# Patient Record
Sex: MALE | Race: WHITE | Employment: FULL TIME
[De-identification: names, ages, dates, MRNs, and addresses within clinical notes are randomized per-mention and may not be internally consistent; named-entity substitution may affect disease eponyms.]

---

## 2021-07-09 ENCOUNTER — TRANSCRIBE ORDERS (OUTPATIENT)
Dept: OTHER | Age: 56
End: 2021-07-09

## 2021-07-09 DIAGNOSIS — I42.8 NONISCHEMIC CARDIOMYOPATHY (H): Primary | ICD-10-CM

## 2021-07-12 ENCOUNTER — TELEPHONE (OUTPATIENT)
Dept: CARDIOLOGY | Facility: CLINIC | Age: 56
End: 2021-07-12

## 2021-07-12 NOTE — TELEPHONE ENCOUNTER
Spoke with pt. Pt asked for specifics on clinic name and physician information and is checking with insurance prior to scheduling his referral. Pt is referred for a general cardiology eval with Dr. Juarez

## 2021-07-13 ENCOUNTER — CARE COORDINATION (OUTPATIENT)
Dept: CARDIOLOGY | Facility: CLINIC | Age: 56
End: 2021-07-13

## 2021-07-13 NOTE — PROGRESS NOTES
Referral- Heart Failure    REFERRING CLINIC INFORMATION:    Referring Info: MD [facility].   Provider Contact Info: [phone]  Nursing Care Team Contact Info:  Tonja     REFERRAL Info/Hx:     Patient Preferred Phone Number:  [phone]   Consent to Communicate: None found   Insurance Obtained: No  Imaging:   Records:       July 13, 2021   Patient being referred for 2nd opinion and ADV therapies to Jj Juarez first, if not available soon, then Pedro Reyes. Left message with nurse to have imaging pushed.     Sending to HF nurse to review.     MRI and Echo are being pushed. Confirmation that images were received.       ATTEMPTS TO CONTACT:  July 09, 2021 - Spoke to patient regarding referral process and contact information exchanged. Will call back to schedule soon.   July 15, 2021 - appt made with Dr. Acuña for 8/25/21      Melquiades Antonio CMA  Heart Failure, Advanced Heart Failure & CORE  Referral Specialist &     [facility]  Cardiology  Office: [phone]  [address] [place] [address]

## 2021-07-15 PROBLEM — F10.21 ALCOHOL DEPENDENCE IN REMISSION (H): Status: ACTIVE | Noted: 2020-12-04

## 2021-07-15 PROBLEM — I42.9 CARDIOMYOPATHY (H): Status: ACTIVE | Noted: 2020-11-30

## 2021-07-15 PROBLEM — G47.33 MODERATE OBSTRUCTIVE SLEEP APNEA: Status: ACTIVE | Noted: 2020-11-30

## 2021-07-15 RX ORDER — ATORVASTATIN CALCIUM 40 MG/1
40 TABLET, FILM COATED ORAL DAILY
COMMUNITY
Start: 2020-11-11 | End: 2024-08-06

## 2021-07-15 RX ORDER — METOPROLOL SUCCINATE 100 MG/1
100 TABLET, EXTENDED RELEASE ORAL DAILY
COMMUNITY
Start: 2021-03-29 | End: 2024-08-06

## 2021-07-15 RX ORDER — SPIRONOLACTONE 25 MG/1
50 TABLET ORAL DAILY
COMMUNITY
Start: 2021-04-16 | End: 2024-08-06

## 2021-07-15 RX ORDER — ASPIRIN 81 MG/1
81 TABLET, CHEWABLE ORAL DAILY
COMMUNITY
Start: 2020-11-11

## 2021-07-15 NOTE — PROGRESS NOTES
Patient is referred by Dr Tulio Barney to Dr. Juarez for nonischemic cardiomyopathy and systolic heart failure. See Dr. Barney's note below.     His nonischemic cardiomyopathy was initially diagnosed November of 2020. His ejection fraction on echo was 10% with LVIDd of 7.7 cm. Coronary angiogram November 23, 2020 showed mild-to-moderate nonobstructive coronary artery disease; right heart catheterization 11/23/2020 demonstrated mean PA pressure was 29, wedge pressure 19 mm Hg, and borderline normal cardiac output of 4.1 liters/minute. A cardiac MRI December 7, 2020 was consistent with nonischemic cardiomyopathy, but no late gadolinium enhancement was seen. He has a known wide left bundle branch block. We have titrated him on goal-directed medical therapy which now includes full-dose Entresto, Toprol  mg b.i.d., and Aldactone 25 mg daily. A repeat transthoracic echocardiogram dated April 12, 2021 showed mild improvement in LV function and size, but severely declined LV function at 17% ejection fraction. Because of his known left bundle-branch block and wide QRS, I referred him to electrophysiology, and he had this appointment on June 11, 2021. It sounds like at that point, CRT defibrillator therapy was recommended, but Joel wanted to think about this more. He is now here for follow-up in general cardiology clinic.     Continues to have mild symptoms with shortness of breath on exertion. Now walks his dog daily. Has NYHA class 2 symptoms. He has no lower extremity edema, PND, orthopnea, palpitations, dizziness, syncope. Remains abstinent from alcohol. Recall that he was drinking approximately 6 oz of hard liquor daily for many years until he achieved abstinence back when his cardiomyopathy was diagnosed at the end of 2020. He has been working full-time over the last month, and is fairly active at his job as an . He does not note any significant limitation.      He is essentially on maximal  goal-directed medical therapy. Although size and function of his LV (and RV) have modestly improved, his LV remains severely dysfunctional. Fortunately, he is only NYHA class 2. At this stage of pathology, it would be highly recommended that he pursue not only an internal cardiac defibrillator to prevent sudden cardiac death but also consideration for cardiac resynchronization therapy with a goal of improving LV function in the long term. Joel is no overall a little skeptical about the procedure. He would like to seek a 2nd opinion, which I think is very reasonable. In light of his severe cardiomyopathy, young age, continued alcohol abstinence, and particularly that he may be a candidate in need for advanced therapies in the distant future, I think a 2nd opinion from a transplant center would be preferable. We had a long discussion today about the risks and benefits of CRT, the indications, and rationale. He will think things over the next couple of days. Overall it sounds like he may be agreeable. If he would like a 2nd opinion, I will refer to the [facility] advanced Heart failure Clinic.

## 2021-08-17 ENCOUNTER — CARE COORDINATION (OUTPATIENT)
Dept: CARDIOLOGY | Facility: CLINIC | Age: 56
End: 2021-08-17

## 2021-08-17 DIAGNOSIS — I42.9 CARDIOMYOPATHY (H): Primary | ICD-10-CM

## 2021-08-24 ENCOUNTER — ANCILLARY PROCEDURE (OUTPATIENT)
Dept: CARDIOLOGY | Facility: CLINIC | Age: 56
End: 2021-08-24
Attending: INTERNAL MEDICINE
Payer: COMMERCIAL

## 2021-08-24 ENCOUNTER — LAB (OUTPATIENT)
Dept: LAB | Facility: CLINIC | Age: 56
End: 2021-08-24
Attending: INTERNAL MEDICINE
Payer: COMMERCIAL

## 2021-08-24 VITALS
OXYGEN SATURATION: 97 % | HEIGHT: 69 IN | BODY MASS INDEX: 29.18 KG/M2 | HEART RATE: 70 BPM | DIASTOLIC BLOOD PRESSURE: 78 MMHG | SYSTOLIC BLOOD PRESSURE: 123 MMHG | WEIGHT: 197 LBS

## 2021-08-24 DIAGNOSIS — I42.9 CARDIOMYOPATHY (H): ICD-10-CM

## 2021-08-24 DIAGNOSIS — I44.7 LEFT BUNDLE BRANCH BLOCK: Primary | ICD-10-CM

## 2021-08-24 DIAGNOSIS — I42.8 NONISCHEMIC CARDIOMYOPATHY (H): ICD-10-CM

## 2021-08-24 DIAGNOSIS — I50.20 HEART FAILURE WITH REDUCED EJECTION FRACTION, NYHA CLASS III (H): ICD-10-CM

## 2021-08-24 DIAGNOSIS — E78.2 MIXED HYPERLIPIDEMIA: ICD-10-CM

## 2021-08-24 DIAGNOSIS — I10 BENIGN ESSENTIAL HYPERTENSION: ICD-10-CM

## 2021-08-24 LAB
ALBUMIN SERPL-MCNC: 4.1 G/DL (ref 3.4–5)
ALP SERPL-CCNC: 68 U/L (ref 40–150)
ALT SERPL W P-5'-P-CCNC: 36 U/L (ref 0–70)
ANION GAP SERPL CALCULATED.3IONS-SCNC: 5 MMOL/L (ref 3–14)
AST SERPL W P-5'-P-CCNC: 19 U/L (ref 0–45)
ATRIAL RATE - MUSE: 72 BPM
BILIRUB SERPL-MCNC: 0.2 MG/DL (ref 0.2–1.3)
BUN SERPL-MCNC: 12 MG/DL (ref 7–30)
CALCIUM SERPL-MCNC: 9.1 MG/DL (ref 8.5–10.1)
CHLORIDE BLD-SCNC: 107 MMOL/L (ref 94–109)
CO2 SERPL-SCNC: 29 MMOL/L (ref 20–32)
CREAT SERPL-MCNC: 1 MG/DL (ref 0.66–1.25)
DIASTOLIC BLOOD PRESSURE - MUSE: NORMAL MMHG
ERYTHROCYTE [DISTWIDTH] IN BLOOD BY AUTOMATED COUNT: 13.2 % (ref 10–15)
GFR SERPL CREATININE-BSD FRML MDRD: 84 ML/MIN/1.73M2
GLUCOSE BLD-MCNC: 108 MG/DL (ref 70–99)
HCT VFR BLD AUTO: 36.9 % (ref 40–53)
HGB BLD-MCNC: 12.4 G/DL (ref 13.3–17.7)
INTERPRETATION ECG - MUSE: NORMAL
LVEF ECHO: NORMAL
MCH RBC QN AUTO: 31.7 PG (ref 26.5–33)
MCHC RBC AUTO-ENTMCNC: 33.6 G/DL (ref 31.5–36.5)
MCV RBC AUTO: 94 FL (ref 78–100)
NT-PROBNP SERPL-MCNC: 299 PG/ML (ref 0–125)
P AXIS - MUSE: 63 DEGREES
PLATELET # BLD AUTO: 242 10E3/UL (ref 150–450)
POTASSIUM BLD-SCNC: 4.3 MMOL/L (ref 3.4–5.3)
PR INTERVAL - MUSE: 240 MS
PROT SERPL-MCNC: 7.9 G/DL (ref 6.8–8.8)
QRS DURATION - MUSE: 188 MS
QT - MUSE: 464 MS
QTC - MUSE: 508 MS
R AXIS - MUSE: 23 DEGREES
RBC # BLD AUTO: 3.91 10E6/UL (ref 4.4–5.9)
SODIUM SERPL-SCNC: 141 MMOL/L (ref 133–144)
SYSTOLIC BLOOD PRESSURE - MUSE: NORMAL MMHG
T AXIS - MUSE: 206 DEGREES
VENTRICULAR RATE- MUSE: 72 BPM
WBC # BLD AUTO: 6.7 10E3/UL (ref 4–11)

## 2021-08-24 PROCEDURE — 93306 TTE W/DOPPLER COMPLETE: CPT | Performed by: INTERNAL MEDICINE

## 2021-08-24 PROCEDURE — G0463 HOSPITAL OUTPT CLINIC VISIT: HCPCS | Mod: 25

## 2021-08-24 PROCEDURE — 80053 COMPREHEN METABOLIC PANEL: CPT | Performed by: PATHOLOGY

## 2021-08-24 PROCEDURE — 83880 ASSAY OF NATRIURETIC PEPTIDE: CPT | Performed by: PATHOLOGY

## 2021-08-24 PROCEDURE — 99204 OFFICE O/P NEW MOD 45 MIN: CPT | Mod: 25 | Performed by: INTERNAL MEDICINE

## 2021-08-24 PROCEDURE — 36415 COLL VENOUS BLD VENIPUNCTURE: CPT | Performed by: PATHOLOGY

## 2021-08-24 PROCEDURE — 85027 COMPLETE CBC AUTOMATED: CPT | Performed by: PATHOLOGY

## 2021-08-24 PROCEDURE — 93005 ELECTROCARDIOGRAM TRACING: CPT

## 2021-08-24 RX ADMIN — Medication 6 ML: at 11:54

## 2021-08-24 ASSESSMENT — PAIN SCALES - GENERAL: PAINLEVEL: NO PAIN (0)

## 2021-08-24 ASSESSMENT — MIFFLIN-ST. JEOR: SCORE: 1706.03

## 2021-08-24 NOTE — PATIENT INSTRUCTIONS
Dr. Juarez recommends:    Follow up clinic visit with Dr. Juarez in 3 months with labs same day.    Thank you for your visit today.  Please call me with any questions or concerns.   Padilla Knight RN  Cardiology Care Coordinator  [phone], press option 1 then option 4

## 2021-08-24 NOTE — LETTER
8/24/2021      RE: Joel Resendiz  [id number]  [facility] [address]       Dear Colleague,    Thank you for the opportunity to participate in the care of your patient, Joel Resendiz, at the [facility] [place] at [facility]. Please see a copy of my visit note below.    [facility] Advanced Heart Failure Clinic   New Patient Visit    HPI:   Dear Colleagues,     We had the pleasure of seeing Joel Resendiz in the  [facility] Cardiology Advanced Heart Failure Clinic on August 23, 2021.     As you know he is a very pleasant 55 yo M with the following significant PMHx:       NICM with HFrEF LVEF 17% TTE 4/2021 ACC AHA Stage C NYHA Class II    Colonoscopy 5/2021- tubular adenoma in cecum    Sleep apnea compliant with CPAP    History Alcohol abuse     Former smoker    Cardiac Hx:   DARRICK initially dx Nov 2020- he had SOB x2-3 months couldn't walk dog <0.5 mile up a small hill next to his house, orthopnea- COVID neg.  TTE with LVEF 10% LVID 7.7cm.  LHC 11/23/20 mild to mod nonobstructive CAD with RHC mPAP 29, WP 19, CO 4.1.   cMRI 12/7/20 showed LVEF 13%, LVIDD 8.1cm, RV EF 27%, no LGE, 2+MR. EKG with wide LBBB QRS 192ms.    He was sarted on GDMT currently on Toprol 100mg bid, full dose Entresto, Aldactone 25mg daily. Other cardiac meds include lipitor 40 and baby aspirin.  Repeat TTE April showed LVEF 17% LVIDD 6.7cm.    He was referred to EP Dr. Vuaghn who he saw in June and CRT-D was recommended but patient wanted to think more about it.   He has been following with Dr. Barney who referred him to us.     Current symptoms/issues:   He reports NYHA Class 2 sxs- he is able to walk his dog daily 30 minutes without issue can do the small hill now.  Not pushing himself. Stairs no issue. Townhouse.    Dizziness if get up quickly.    Did do cardiac rehab.   No LE edema, orthopnea, PND. Never needed lasix.  No palpitations,  presyncope, or syncope.  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  Appetite/Diet/Fluid- Appetite fine. Limits salt but not always.   Exercise- not formal exercise.   Does not feel like he is slowing down.    PFTs ordered but not done.     Social Hx:     Etoh: hx of alcohol abuse has been in treatment and quit for 6 years.  Before was drinking about 200-250mL of liquor daily for at least 5 years until dx with HF in 2020.     Smoking: quit in , 1pk would last a week/social smoker.    Other drugs/supplements: no    Work: works as an  but now in the [employer] department so very sedentary. 5-7k steps/day.    Family/Social support: , currently in a relationship for 4 years with live in . 1 son in college in [place], daughter in [place].    Family hx:   -mom 87 in nursing home-has CAD w/stents  -3 brothers  -dad passed- had CAD w/stents but  of cancer  -dad father  in his sleep in his 50s  -No hx of HF, premature CAD, SCD, stroke, DM.    PAST MEDICAL HISTORY:  No past medical history on file.  Reviewed above.     FAMILY HISTORY: reviewed    SOCIAL HISTORY: reviewed    CURRENT MEDICATIONS:    Current Outpatient Medications:      aspirin (ASA) 81 MG chewable tablet, Take 81 mg by mouth daily, Disp: , Rfl:      atorvastatin (LIPITOR) 40 MG tablet, Take 40 mg by mouth daily, Disp: , Rfl:      metoprolol succinate ER (TOPROL-XL) 100 MG 24 hr tablet, Take 100 mg by mouth 2 times daily, Disp: , Rfl:      sacubitril-valsartan (ENTRESTO)  MG per tablet, Take 1 tablet by mouth 2 times daily, Disp: , Rfl:      spironolactone (ALDACTONE) 25 MG tablet, Take 50 mg by mouth daily, Disp: , Rfl:     ROS:   Constitutional: No fever, chills, or sweats. Weight . + fatigue   ENT: No visual disturbance, ear ache, epistaxis, sore throat.   Allergies/Immunologic: Negative.   Respiratory: No cough, hemoptysis.   Cardiovascular: As per HPI.   GI: No nausea, vomiting,  "hematemesis, melena, or hematochezia.   : No urinary frequency, dysuria, or hematuria.   Integument: Negative.   Psychiatric: Negative.   Neuro: Negative.   Endocrinology: Negative.   Musculoskeletal: Negative.    EXAM:  /78 (BP Location: Right arm, Patient Position: Chair, Cuff Size: Adult Regular)   Pulse 70   Ht 1.74 m (5' 8.5\")   Wt 89.4 kg (197 lb)   SpO2 97%   BMI 29.52 kg/m     General: appears comfortable, alert and articulate  Head: normocephalic, atraumatic  Eyes: anicteric sclera, EOMI  Neck: no adenopathy  Orophyarynx: moist mucosa, no lesions, dentition intact  Heart: regular rate and rhythm, S1/S2 normal, no rub, estimated JVP 9 cm   Lungs: clear, no rales or wheezing  Abdomen: soft, non-tender, bowel sounds present, no hepatosplenomegaly  Extremities: no clubbing, cyanosis or edema  Neurological: normal speech and affect, no gross motor deficit    Relevant Tests/Imaging:     TTE 4/12/21  1. Moderate left ventricular dilation is present. LVEDd 6.7 cm. Normal   left ventricular wall thickness. Abnormal septal motion consistent   with left bundle branch block. Severe diffuse hypokinesis is present.   Left ventricular ejection fraction is visually estimated at 17%. Left   ventricular Doppler filling pattern consistent with Grade I (mild)   diastolic dysfunction.   2. Normal right ventricle size and normal global function.   3. No significant valve pathology was seen.   4. Moderate dilation of the aorta is present involving the sinuses of   Valsalva. (Maximal dimension 4.7 cm).   5. The inferior vena cava is small suggesting volume depletion.   6. There is no pericardial effusion.   7. Compared to the previous study done on 11/11/20, the left ventricle   has decreased in size but the left ventricular systolic function   remains severely depressed. The right ventricle has normalized in size   and function. There is less mitral and tricuspid regurgitation. The   central venous pressure has " normalized. However, the sinus of Valsalva   of the aorta measures larger (4.2 cm --> 4.7 cm).     7D [place] Jan 2021  Findings   Patient had a min HR of 41 bpm, max sinus HR of 178 bpm, and avg HR of 74 bpm.  Predominant underlying rhythm was Sinus Rhythm. First Degree AV Block was present. Bundle Branch Block/IVCD was present. QRS morphology changes were present throughout recording.     5 Ventricular Tachycardia runs occurred, the run with the fastest interval lasting 5 beats with a max rate of 179 bpm, the longest lasting 4 beats with an avg rate of 101 bpm.     Isolated SVEs were rare (<1.0%), SVE Couplets were rare (<1.0%), and SVE Triplets were rare (<1.0%).     Isolated VEs were rare (<1.0%, 231), VE Couplets were rare (<1.0%, 3), and VE Triplets were rare (<1.0%, 4). Ventricular Bigeminy was present.     Comment: Symptoms occurred in association with PVCs or ventricular ectopy.     Cardiac MRI 12/7/20  IMPRESSION:   1. The left ventricular systolic function was abnormal; calculated ejection fraction 13%.   Severe diffuse hypokinesis with abnormal septal motion consistent with LBBB.   2. Severe LV dilatation (LVIDD 81mm) with increased LV volumes.   3. Reduced RV systolic function. EF is calculated at 27%.   4. Delayed enhancement was not seen.  5. At least 2+ mitral regurgitation is seen.   6. Mild dilatation of Sinuses of Valsalva (42mm)   7. No LV clot seen.   Findings are consistent with non ischemic cardiomyopathy.     L+R Cath 11/23/20   Conclusions     1. Mild-to-moderate coronary artery atherosclerosis.     2. Mild pulmonary artery hypertension (mean PA = 29 mmHg), related to   elevated left atrial pressure.     3. Mild pulmonary capillary wedge hypertension (mean PCWP = 19 mmHg).     4. Mildly reduced cardiac output (thermodilution) of 4.1 L/min (CI = 2.0   L/min/m2).     5. Nonischemic cardiomyopathy.       Diagnostic Findings     * Coronary angiography shows right dominance.     * Left Main has  no disease.     * Proximal Left Anterior Descending: minimal  30% stenosis, NAIF: 3 flow.     * Mid Left Anterior Descending: minimal  30% stenosis, NAIF: 3 flow.     * Proximal Circumflex: luminal irregularities, NAIF: 3 flow.     * Mid Circumflex: mild  40% stenosis, NAIF: 3 flow.     * Proximal Right Coronary Artery: minimal  30% stenosis, NAIF: 3 flow.     * Mid Right Coronary Artery: mild  40% stenosis, NAIF: 3 flow.       Cath Hemodynamic Data   Pressure Summary       Phase:Baseline       AO :  87 mmHg / 67 mmHg ( 59 mmHg )        RV :  39 mmHg / 3 mmHg / 11 mmHg )      PA :  42 mmHg / 21 mmHg ( 29 mmHg )       RA :  a wave = 32,767 mmHg v wave = 9 mmHg mean = 7 mmHg        PCW :  a wave = 32,767 mmHg v wave = 26 mmHg mean = 19 mmHg              a wave = 32,767 mmHg v wave = 23 mmHg mean = 19 mmHg        AO HR:  77 bpm    Saturations       Phase:Baseline       AO :  99.60 %         PA :  73.40 %               73.40 %         PCW :  97.90 %    VO2 Content       Phase:Baseline       VO2 :  249.25 ml/min    Resistance Results (Wood Units)       Phase:Baseline       PVR :  1.71 DYER        SVR :  9.51 DYER    Cardiac Output       Phase:Baseline       Samuel :  5.46 l/min        Thermo Dilution :  4 l/min        Samuel Cardiac Index:  2.69 L/min/m2        Thermo Dilution Cardiac Index:  2 L/min/m2      TTE 11/11/2020  CONCLUSIONS   # Severe left ventricular dilation is present. LVEDd 7.7 cm. Severe   diffuse hypokinesis is present. Left ventricular ejection fraction is   visually estimated at 10%.   # Increased trabeculation is seen in the apex, but no clear thrombus   on the contrast enhanced pictures.   # Right ventricular dilation is present. Basal diameter is 5.1 cm.   Global right ventricular systolic function is moderately reduced.   # Bi atrial enlargement.   # Mild to moderate mitral regurgitation.   # Pulmonary artery systolic pressure estimate is 26 mmHg above   RAP(Normal) .   # Mild dilation of the aorta is  present involving the sinuses of   Valsalva. (Maximal dimension 4.2 cm).   # Dilation of the inferior vena cava (> 2.1 cm) with abnormal   respiratory variation in diameter. Estimated right atrial pressure is   15 mmHg .   # There is no pericardial effusion.     No previous study available for comparison.   Given the above abnormalities, the patient is scheduled for urgent   cardiology consult later today.      Lexiscan 11/11/20  Summary     1. Myocardial perfusion imaging is abnormal.     2. A regadenoson infusion pharmacologic stress test was performed.     3. The patient experienced no symptoms during the stress test.     4. Nondiagnostic stress ECG due to left bundle branch block.     5. Severely enlarged left ventricular size.     6. There was a large, medium, predominantly fixed myocardial perfusion   defect of the inferior septum. There is also a small defect in the apex   with   some degree of reversibility.     7. Abnormal left ventricular systolic function. Calculated LVEF 9 %.     8. Based on this study, the annual cardiovascular mortality rate is high   (greater than 3%).     9. Findings suggest non ischemic cardiomyopathy as the degree of   perfusion   defect is out of proportion to the LV dysfunction.     Labs:  May 2021 OSH- [facility] nl  Cr 1.08 K 4.9    Assessment and Plan:      In summary, Joel Resendiz is 56 year old M with NICM presenting for evaluation for advanced therapies.  He has systolic HF ACC AHA Stage C and NYHA Functional Class II sxs. Today he appears compensated and euvolemic.  He is on max doses of GDMT and appears to be tolerating them well.  He has been referred for CRT-D but wanted a second opinion. QRS is 188 ms wide LBBB on EKG today. We discussed it today and he seems more agreeable but would like to see what his EF is.  His last EF was 17% on TTE in April so we agreed that we could get another one to reevaluate.  We also discussed the trajectory of heart failure and given his  young age how he will like be able to be evaluated for and considered for advanced therapies.    For now we will recommend CRT-D and continuing his current medication regimen.  He can continue to follow up with Dr. Barney and us and we will continue to keep a close eye on him. We can also consider addition of SGLTi.      NICM with HFrEF LVEF 17% TTE 4/2021 ACC AHA Stage C NYHA Class II    Colonoscopy 5/2021- tubular adenoma in cecum    Sleep apnea compliant with CPAP    History Alcohol abuse     Former smoker    HFrEF GDMT & Regimen:   ACEi/ARB: entrestro  Role for Entresto: full dose entresto  BB: Toprol 100bid  Aldosterone antagonist: aldactone 25mg daily   SCD prophylaxis: pending CRT-D    NSAID use: None, counseled/reminded  Smoking/Etoh/Drugs Counseling: done  Diet/Fluid Restriction Counseling: Done/reminded  Cardiac Rehab: completed  Need for advanced therapies/workup: Discussed continue to monitor       Plan:  -echo, patient would like to have this done here today as he does have the day off. It is reasonable to reevaluate his cardiac function  -agree with proceeding with CRT-D implantation. I do believe this would give him, potentially, significant clinical benefit. This was discussed with pt extensively. About 72% of patients will have good response to resynchronization.  -continue current meds no changes, he is on excellent medical therapy for his HFrEF at this time.   -f/u with Dr. Juarez in 3 months with labs in the same day. He is to follow up with Dr. Barney and Dr. Vaughn of cardiology and EP for further discussion and device implantation    Patient was seen and examined with Dr. Juarez.  Jacque Huizar MD.  Adv HF Fellow    I have seen and evaluated the patient and agree with the assessment and plan as documented above.  Jj Juarez MD      Please do not hesitate to contact me if you have any questions/concerns.     Sincerely,     Jj Juarez MD

## 2021-08-24 NOTE — PROGRESS NOTES
[facility]   New Patient Visit    HPI:   Dear Colleagues,     We had the pleasure of seeing Joel Resendiz in the  [facility] on August 23, 2021.     As you know he is a very pleasant 57 yo M with the following significant PMHx:       NICM with HFrEF LVEF 17% TTE 4/2021 ACC AHA Stage C NYHA Class II    Colonoscopy 5/2021- tubular adenoma in cecum    Sleep apnea compliant with CPAP    History Alcohol abuse     Former smoker    Cardiac Hx:   NICM initially dx Nov 2020- he had SOB x2-3 months couldn't walk dog <0.5 mile up a small hill next to his house, orthopnea- COVID neg.  TTE with LVEF 10% LVID 7.7cm.  LHC 11/23/20 mild to mod nonobstructive CAD with RHC mPAP 29, WP 19, CO 4.1.   cMRI 12/7/20 showed LVEF 13%, LVIDD 8.1cm, RV EF 27%, no LGE, 2+MR. EKG with wide LBBB QRS 192ms.    He was sarted on GDMT currently on Toprol 100mg bid, full dose Entresto, Aldactone 25mg daily. Other cardiac meds include lipitor 40 and baby aspirin.  Repeat TTE April showed LVEF 17% LVIDD 6.7cm.    He was referred to EP Dr. Vaughn who he saw in June and CRT-D was recommended but patient wanted to think more about it.   He has been following with Dr. Barney who referred him to us.     Current symptoms/issues:   He reports NYHA Class 2 sxs- he is able to walk his dog daily 30 minutes without issue can do the small hill now.  Not pushing himself. Stairs no issue. Townhouse.    Dizziness if get up quickly.    Did do cardiac rehab.   No LE edema, orthopnea, PND. Never needed lasix.  No palpitations, presyncope, or syncope.  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  Appetite/Diet/Fluid- Appetite fine. Limits salt but not always.   Exercise- not formal exercise.   Does not feel like he is slowing down.    PFTs ordered but not done.     Social Hx:     Etoh: hx of alcohol abuse has been in treatment  "and quit for 6 years.  Before was drinking about 200-250mL of liquor daily for at least 5 years until dx with HF in 2020.     Smoking: quit in , 1pk would last a week/social smoker.    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 5-7k steps/day.    Family/Social support: , currently in a relationship for 4 years with live in . 1 son in college in [place], daughter in [place].    Family hx:   -mom 87 in nursing home-has CAD w/stents  -3 brothers  -dad passed- had CAD w/stents but  of cancer  -dad father  in his sleep in his 50s  -No hx of HF, premature CAD, SCD, stroke, DM.    PAST MEDICAL HISTORY:  No past medical history on file.  Reviewed above.     FAMILY HISTORY: reviewed    SOCIAL HISTORY: reviewed    CURRENT MEDICATIONS:    Current Outpatient Medications:      aspirin (ASA) 81 MG chewable tablet, Take 81 mg by mouth daily, Disp: , Rfl:      atorvastatin (LIPITOR) 40 MG tablet, Take 40 mg by mouth daily, Disp: , Rfl:      metoprolol succinate ER (TOPROL-XL) 100 MG 24 hr tablet, Take 100 mg by mouth 2 times daily, Disp: , Rfl:      sacubitril-valsartan (ENTRESTO)  MG per tablet, Take 1 tablet by mouth 2 times daily, Disp: , Rfl:      spironolactone (ALDACTONE) 25 MG tablet, Take 50 mg by mouth daily, Disp: , Rfl:     ROS:   Constitutional: No fever, chills, or sweats. Weight . + fatigue   ENT: No visual disturbance, ear ache, epistaxis, sore throat.   Allergies/Immunologic: Negative.   Respiratory: No cough, hemoptysis.   Cardiovascular: As per HPI.   GI: No nausea, vomiting, hematemesis, melena, or hematochezia.   : No urinary frequency, dysuria, or hematuria.   Integument: Negative.   Psychiatric: Negative.   Neuro: Negative.   Endocrinology: Negative.   Musculoskeletal: Negative.    EXAM:  /78 (BP Location: Right arm, Patient Position: Chair, Cuff Size: Adult Regular)   Pulse 70   Ht 1.74 m (5' 8.5\")   Wt " 89.4 kg (197 lb)   SpO2 97%   BMI 29.52 kg/m     General: appears comfortable, alert and articulate  Head: normocephalic, atraumatic  Eyes: anicteric sclera, EOMI  Neck: no adenopathy  Orophyarynx: moist mucosa, no lesions, dentition intact  Heart: regular rate and rhythm, S1/S2 normal, no rub, estimated JVP 9 cm   Lungs: clear, no rales or wheezing  Abdomen: soft, non-tender, bowel sounds present, no hepatosplenomegaly  Extremities: no clubbing, cyanosis or edema  Neurological: normal speech and affect, no gross motor deficit    Relevant Tests/Imaging:     TTE 4/12/21  1. Moderate left ventricular dilation is present. LVEDd 6.7 cm. Normal   left ventricular wall thickness. Abnormal septal motion consistent   with left bundle branch block. Severe diffuse hypokinesis is present.   Left ventricular ejection fraction is visually estimated at 17%. Left   ventricular Doppler filling pattern consistent with Grade I (mild)   diastolic dysfunction.   2. Normal right ventricle size and normal global function.   3. No significant valve pathology was seen.   4. Moderate dilation of the aorta is present involving the sinuses of   Valsalva. (Maximal dimension 4.7 cm).   5. The inferior vena cava is small suggesting volume depletion.   6. There is no pericardial effusion.   7. Compared to the previous study done on 11/11/20, the left ventricle   has decreased in size but the left ventricular systolic function   remains severely depressed. The right ventricle has normalized in size   and function. There is less mitral and tricuspid regurgitation. The   central venous pressure has normalized. However, the sinus of Valsalva   of the aorta measures larger (4.2 cm --> 4.7 cm).     7D [facility] Jan 2021  Findings   Patient had a min HR of 41 bpm, max sinus HR of 178 bpm, and avg HR of 74 bpm.  Predominant underlying rhythm was Sinus Rhythm. First Degree AV Block was present. Bundle Branch Block/IVCD was present. QRS morphology changes  were present throughout recording.     5 Ventricular Tachycardia runs occurred, the run with the fastest interval lasting 5 beats with a max rate of 179 bpm, the longest lasting 4 beats with an avg rate of 101 bpm.     Isolated SVEs were rare (<1.0%), SVE Couplets were rare (<1.0%), and SVE Triplets were rare (<1.0%).     Isolated VEs were rare (<1.0%, 231), VE Couplets were rare (<1.0%, 3), and VE Triplets were rare (<1.0%, 4). Ventricular Bigeminy was present.     Comment: Symptoms occurred in association with PVCs or ventricular ectopy.     Cardiac MRI 12/7/20  IMPRESSION:   1. The left ventricular systolic function was abnormal; calculated ejection fraction 13%.   Severe diffuse hypokinesis with abnormal septal motion consistent with LBBB.   2. Severe LV dilatation (LVIDD 81mm) with increased LV volumes.   3. Reduced RV systolic function. EF is calculated at 27%.   4. Delayed enhancement was not seen.  5. At least 2+ mitral regurgitation is seen.   6. Mild dilatation of Sinuses of Valsalva (42mm)   7. No LV clot seen.   Findings are consistent with non ischemic cardiomyopathy.     L+R Cath 11/23/20   Conclusions     1. Mild-to-moderate coronary artery atherosclerosis.     2. Mild pulmonary artery hypertension (mean PA = 29 mmHg), related to   elevated left atrial pressure.     3. Mild pulmonary capillary wedge hypertension (mean PCWP = 19 mmHg).     4. Mildly reduced cardiac output (thermodilution) of 4.1 L/min (CI = 2.0   L/min/m2).     5. Nonischemic cardiomyopathy.       Diagnostic Findings     * Coronary angiography shows right dominance.     * Left Main has no disease.     * Proximal Left Anterior Descending: minimal  30% stenosis, NAIF: 3 flow.     * Mid Left Anterior Descending: minimal  30% stenosis, NAIF: 3 flow.     * Proximal Circumflex: luminal irregularities, NAIF: 3 flow.     * Mid Circumflex: mild  40% stenosis, NAIF: 3 flow.     * Proximal Right Coronary Artery: minimal  30% stenosis, NAIF: 3  flow.     * Mid Right Coronary Artery: mild  40% stenosis, NAIF: 3 flow.       Cath Hemodynamic Data   Pressure Summary       Phase:Baseline       AO :  87 mmHg / 67 mmHg ( 59 mmHg )        RV :  39 mmHg / 3 mmHg / 11 mmHg )      PA :  42 mmHg / 21 mmHg ( 29 mmHg )       RA :  a wave = 32,767 mmHg v wave = 9 mmHg mean = 7 mmHg        PCW :  a wave = 32,767 mmHg v wave = 26 mmHg mean = 19 mmHg              a wave = 32,767 mmHg v wave = 23 mmHg mean = 19 mmHg        AO HR:  77 bpm    Saturations       Phase:Baseline       AO :  99.60 %         PA :  73.40 %               73.40 %         PCW :  97.90 %    VO2 Content       Phase:Baseline       VO2 :  249.25 ml/min    Resistance Results (Wood Units)       Phase:Baseline       PVR :  1.71 DYER        SVR :  9.51 DYER    Cardiac Output       Phase:Baseline       Samuel :  5.46 l/min        Thermo Dilution :  4 l/min        Samuel Cardiac Index:  2.69 L/min/m2        Thermo Dilution Cardiac Index:  2 L/min/m2      TTE 11/11/2020  CONCLUSIONS   # Severe left ventricular dilation is present. LVEDd 7.7 cm. Severe   diffuse hypokinesis is present. Left ventricular ejection fraction is   visually estimated at 10%.   # Increased trabeculation is seen in the apex, but no clear thrombus   on the contrast enhanced pictures.   # Right ventricular dilation is present. Basal diameter is 5.1 cm.   Global right ventricular systolic function is moderately reduced.   # Bi atrial enlargement.   # Mild to moderate mitral regurgitation.   # Pulmonary artery systolic pressure estimate is 26 mmHg above   RAP(Normal) .   # Mild dilation of the aorta is present involving the sinuses of   Valsalva. (Maximal dimension 4.2 cm).   # Dilation of the inferior vena cava (> 2.1 cm) with abnormal   respiratory variation in diameter. Estimated right atrial pressure is   15 mmHg .   # There is no pericardial effusion.     No previous study available for comparison.   Given the above abnormalities, the patient is  scheduled for urgent   cardiology consult later today.      Lexiscan 11/11/20  Summary     1. Myocardial perfusion imaging is abnormal.     2. A regadenoson infusion pharmacologic stress test was performed.     3. The patient experienced no symptoms during the stress test.     4. Nondiagnostic stress ECG due to left bundle branch block.     5. Severely enlarged left ventricular size.     6. There was a large, medium, predominantly fixed myocardial perfusion   defect of the inferior septum. There is also a small defect in the apex   with   some degree of reversibility.     7. Abnormal left ventricular systolic function. Calculated LVEF 9 %.     8. Based on this study, the annual cardiovascular mortality rate is high   (greater than 3%).     9. Findings suggest non ischemic cardiomyopathy as the degree of   perfusion   defect is out of proportion to the LV dysfunction.     Labs:  May 2021 OSH- BMP nl  Cr 1.08 K 4.9    Assessment and Plan:      In summary, Joel Resendiz is 56 year old M with NICM presenting for evaluation for advanced therapies.  He has systolic HF ACC AHA Stage C and NYHA Functional Class II sxs. Today he appears compensated and euvolemic.  He is on max doses of GDMT and appears to be tolerating them well.  He has been referred for CRT-D but wanted a second opinion. QRS is 188 ms wide LBBB on EKG today. We discussed it today and he seems more agreeable but would like to see what his EF is.  His last EF was 17% on TTE in April so we agreed that we could get another one to reevaluate.  We also discussed the trajectory of heart failure and given his young age how he will like be able to be evaluated for and considered for advanced therapies.    For now we will recommend CRT-D and continuing his current medication regimen.  He can continue to follow up with Dr. Barney and us and we will continue to keep a close eye on him. We can also consider addition of SGLTi.      NICM with HFrEF LVEF 17% TTE 4/2021  ACC AHA Stage C NYHA Class II    Colonoscopy 5/2021- tubular adenoma in cecum    Sleep apnea compliant with CPAP    History Alcohol abuse     Former smoker    HFrEF GDMT & Regimen:   ACEi/ARB: entrestro  Role for Entresto: full dose entresto  BB: Toprol 100bid  Aldosterone antagonist: aldactone 25mg daily   SCD prophylaxis: pending CRT-D    NSAID use: None, counseled/reminded  Smoking/Etoh/Drugs Counseling: done  Diet/Fluid Restriction Counseling: Done/reminded  Cardiac Rehab: completed  Need for advanced therapies/workup: Discussed continue to monitor       Plan:  -echo, patient would like to have this done here today as he does have the day off. It is reasonable to reevaluate his cardiac function  -agree with proceeding with CRT-D implantation. I do believe this would give him, potentially, significant clinical benefit. This was discussed with pt extensively. About 72% of patients will have good response to resynchronization.  -continue current meds no changes, he is on excellent medical therapy for his HFrEF at this time.   -f/u with Dr. Juarez in 3 months with labs in the same day. He is to follow up with Dr. Barney and Dr. Vaughn of cardiology and EP for further discussion and device implantation    Patient was seen and examined with Dr. Juarez.  Jacque Huizar MD.  Adv HF Fellow    I have seen and evaluated the patient and agree with the assessment and plan as documented above.  Jj Juarez MD

## 2021-09-04 ENCOUNTER — HEALTH MAINTENANCE LETTER (OUTPATIENT)
Age: 56
End: 2021-09-04

## 2022-03-19 ASSESSMENT — ENCOUNTER SYMPTOMS
NAIL CHANGES: 0
SKIN CHANGES: 0
POOR WOUND HEALING: 0

## 2022-03-21 NOTE — PROGRESS NOTES
[facility]   Clinic visit 3/22/2022    HPI:   Dear Colleagues,     We had the pleasure of seeing Joel Resendiz in the  [facility].     As you know he has the following significant PMHx:     DARRICK with HFrEF LVEF 17% TTE 4/2021 ACC AHA Stage C NYHA Class II    Colonoscopy 5/2021- tubular adenoma in cecum    Sleep apnea compliant with CPAP    History Alcohol abuse     Former smoker    Cardiac Hx:   NICTIFFANIE initially dx Nov 2020- he had SOB x2-3 months couldn't walk dog <0.5 mile up a small hill next to his house, orthopnea- COVID neg.  TTE with LVEF 10% LVID 7.7cm.  LHC 11/23/20 mild to mod nonobstructive CAD with RHC mPAP 29, WP 19, CO 4.1.   cMRI 12/7/20 showed LVEF 13%, LVIDD 8.1cm, RV EF 27%, no LGE, 2+MR. EKG with wide LBBB QRS 192ms.  He was started on GDMT on Toprol 100mg bid, full dose Entresto, Aldactone 25mg daily. Other cardiac meds include lipitor 40 and baby aspirin.  TTE April 2021 showed LVEF 17% LVIDD 6.7cm. TTE in August 2021 showed LVEF 20-25%, LVIDD 5.9cm.   He was previously referred to EP Dr. Vaughn who he saw him in June 2021 and CRT-D was recommended but patient deferred it at that time.  He has been following with Dr. Barney at [facility] who referred him to us.     Current symptoms/issues:   He reports NYHA Class 2 sxs- he is able to walk his dog daily 30 minutes and climb several flights of stairs.   No palpitations, presyncope, or syncope.  Reported feeling better but continues to endorse occasional fatigue.   He also reported midline localized chest discomfort which occurs spontaneously at rest and resolves with time. Noted to be relieved when he presses on it. Not associated with food or activity.   Had repeat TTE done at [facility] on 2/2022 which showed recovered LVEF     Social Hx:     Etoh: hx of alcohol abuse has been in treatment and quit for 6 years.  Before was drinking about  "200-250mL of liquor daily for at least 5 years until dx with HF in 2020.     Smoking: quit in , 1pk would last a week/social smoker.    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 5-7k steps/day.    Family/Social support: , currently in a relationship for 4 years with live in . 1 son in college in [place], daughter in [place].    Family hx:   -mom 87 in nursing home-has CAD w/stents  -3 brothers  -dad passed- had CAD w/stents but  of cancer  -dad father  in his sleep in his 50s  -No hx of HF, premature CAD, SCD, stroke, DM.    CURRENT MEDICATIONS:    Current Outpatient Medications:      Ascorbic Acid (VITAMIN C) POWD, , Disp: , Rfl:      aspirin (ASA) 81 MG chewable tablet, Take 81 mg by mouth daily, Disp: , Rfl:      atorvastatin (LIPITOR) 40 MG tablet, Take 40 mg by mouth daily, Disp: , Rfl:      metoprolol succinate ER (TOPROL-XL) 100 MG 24 hr tablet, Take 100 mg by mouth daily , Disp: , Rfl:      sacubitril-valsartan (ENTRESTO)  MG per tablet, Take 1 tablet by mouth 2 times daily, Disp: , Rfl:      spironolactone (ALDACTONE) 25 MG tablet, Take 50 mg by mouth daily, Disp: , Rfl:     ROS:   Constitutional: No fever, chills, or sweats. Weight . + fatigue   ENT: No visual disturbance, ear ache, epistaxis, sore throat.   Allergies/Immunologic: Negative.   Respiratory: No cough, hemoptysis.   Cardiovascular: As per HPI.   GI: No nausea, vomiting, hematemesis, melena, or hematochezia.   : No urinary frequency, dysuria, or hematuria.   Integument: Negative.   Psychiatric: Negative.   Neuro: Negative.   Endocrinology: Negative.   Musculoskeletal: Negative.    EXAM:  /72 (BP Location: Right arm, Patient Position: Chair, Cuff Size: Adult Regular)   Pulse 74   Ht 1.763 m (5' 9.41\")   Wt 91.6 kg (202 lb)   SpO2 96%   BMI 29.48 kg/m     General: appears comfortable, alert and articulate  Head: normocephalic, " atraumatic  Eyes: anicteric sclera, EOMI  Neck: no adenopathy  Orophyarynx: moist mucosa, no lesions, dentition intact  Heart: regular rate and rhythm, S1/S2 normal, no rub, estimated JVP 8 cm   Lungs: clear, no rales or wheezing  Abdomen: soft, non-tender, bowel sounds present, no hepatosplenomegaly  Extremities: no clubbing, cyanosis or edema  Neurological: normal speech and affect, no gross motor deficit    Relevant Tests/Imaging:     TTE 4/12/21  1. Moderate left ventricular dilation is present. LVEDd 6.7 cm. Normal   left ventricular wall thickness. Abnormal septal motion consistent   with left bundle branch block. Severe diffuse hypokinesis is present.   Left ventricular ejection fraction is visually estimated at 17%. Left   ventricular Doppler filling pattern consistent with Grade I (mild)   diastolic dysfunction.   2. Normal right ventricle size and normal global function.   3. No significant valve pathology was seen.   4. Moderate dilation of the aorta is present involving the sinuses of   Valsalva. (Maximal dimension 4.7 cm).   5. The inferior vena cava is small suggesting volume depletion.   6. There is no pericardial effusion.   7. Compared to the previous study done on 11/11/20, the left ventricle   has decreased in size but the left ventricular systolic function   remains severely depressed. The right ventricle has normalized in size   and function. There is less mitral and tricuspid regurgitation. The   central venous pressure has normalized. However, the sinus of Valsalva   of the aorta measures larger (4.2 cm --> 4.7 cm).     [facility]  [facility]ings   Patient had a min HR of 41 bpm, max sinus HR of 178 bpm, and avg HR of 74 bpm.  Predominant underlying rhythm was Sinus Rhythm. First Degree AV Block was present. Bundle Branch Block/IVCD was present. QRS morphology changes were present throughout recording.   5 Ventricular Tachycardia runs occurred, the run with the fastest interval lasting 5  beats with a max rate of 179 bpm, the longest lasting 4 beats with an avg rate of 101 bpm.   Isolated SVEs were rare (<1.0%), SVE Couplets were rare (<1.0%), and SVE Triplets were rare (<1.0%).   Isolated VEs were rare (<1.0%, 231), VE Couplets were rare (<1.0%, 3), and VE Triplets were rare (<1.0%, 4). Ventricular Bigeminy was present.   Comment: Symptoms occurred in association with PVCs or ventricular ectopy.     Cardiac MRI 12/7/20  IMPRESSION:   1. The left ventricular systolic function was abnormal; calculated ejection fraction 13%.   Severe diffuse hypokinesis with abnormal septal motion consistent with LBBB.   2. Severe LV dilatation (LVIDD 81mm) with increased LV volumes.   3. Reduced RV systolic function. EF is calculated at 27%.   4. Delayed enhancement was not seen.  5. At least 2+ mitral regurgitation is seen.   6. Mild dilatation of Sinuses of Valsalva (42mm)   7. No LV clot seen.   Findings are consistent with non ischemic cardiomyopathy.     L+R Cath 11/23/20   Conclusions     1. Mild-to-moderate coronary artery atherosclerosis.     2. Mild pulmonary artery hypertension (mean PA = 29 mmHg), related to   elevated left atrial pressure.     3. Mild pulmonary capillary wedge hypertension (mean PCWP = 19 mmHg).     4. Mildly reduced cardiac output (thermodilution) of 4.1 L/min (CI = 2.0   L/min/m2).     5. Nonischemic cardiomyopathy.   Diagnostic Findings     * Coronary angiography shows right dominance.     * Left Main has no disease.     * Proximal Left Anterior Descending: minimal  30% stenosis, NAIF: 3 flow.     * Mid Left Anterior Descending: minimal  30% stenosis, NAIF: 3 flow.     * Proximal Circumflex: luminal irregularities, NAIF: 3 flow.     * Mid Circumflex: mild  40% stenosis, NAIF: 3 flow.     * Proximal Right Coronary Artery: minimal  30% stenosis, NAIF: 3 flow.     * Mid Right Coronary Artery: mild  40% stenosis, NAIF: 3 flow.     Cath Hemodynamic Data       Phase:Baseline       AO :  87  mmHg / 67 mmHg ( 59 mmHg )        RV :  39 mmHg / 3 mmHg / 11 mmHg )      PA :  42 mmHg / 21 mmHg ( 29 mmHg )       RA :  a wave = 32,767 mmHg v wave = 9 mmHg mean = 7 mmHg        PCW :  a wave = 32,767 mmHg v wave = 26 mmHg mean = 19 mmHg              a wave = 32,767 mmHg v wave = 23 mmHg mean = 19 mmHg        AO HR:  77 bpm    Saturations       Phase:Baseline       AO :  99.60 %         PA :  73.40 %               73.40 %         PCW :  97.90 %    VO2 Content       Phase:Baseline       VO2 :  249.25 ml/min    Resistance Results (Wood Units)       Phase:Baseline       PVR :  1.71 DYER        SVR :  9.51 DYER    Cardiac Output       Phase:Baseline       Samuel :  5.46 l/min        Thermo Dilution :  4 l/min        Samuel Cardiac Index:  2.69 L/min/m2        Thermo Dilution Cardiac Index:  2 L/min/m2      TTE 11/11/2020  CONCLUSIONS   # Severe left ventricular dilation is present. LVEDd 7.7 cm. Severe   diffuse hypokinesis is present. Left ventricular ejection fraction is   visually estimated at 10%.   # Increased trabeculation is seen in the apex, but no clear thrombus   on the contrast enhanced pictures.   # Right ventricular dilation is present. Basal diameter is 5.1 cm.   Global right ventricular systolic function is moderately reduced.   # Bi atrial enlargement.   # Mild to moderate mitral regurgitation.   # Pulmonary artery systolic pressure estimate is 26 mmHg above   RAP(Normal) .   # Mild dilation of the aorta is present involving the sinuses of   Valsalva. (Maximal dimension 4.2 cm).   # Dilation of the inferior vena cava (> 2.1 cm) with abnormal   respiratory variation in diameter. Estimated right atrial pressure is   15 mmHg .   # There is no pericardial effusion.   No previous study available for comparison.   Given the above abnormalities, the patient is scheduled for urgent  cardiology consult later today.      Lexiscan 11/11/20  Summary     1. Myocardial perfusion imaging is abnormal.     2. A regadenoson  infusion pharmacologic stress test was performed.     3. The patient experienced no symptoms during the stress test.     4. Nondiagnostic stress ECG due to left bundle branch block.     5. Severely enlarged left ventricular size.     6. There was a large, medium, predominantly fixed myocardial perfusion   defect of the inferior septum. There is also a small defect in the apex with some degree of reversibility.     7. Abnormal left ventricular systolic function. Calculated LVEF 9 %.     8. Based on this study, the annual cardiovascular mortality rate is high (greater than 3%).     9. Findings suggest non ischemic cardiomyopathy as the degree of perfusion defect is out of proportion to the LV dysfunction.     TTE 8/2021   The visual ejection fraction is 20-25% with moderate dilation (LVEDd 5.9cm).  Left ventricular wall thickness is normal. There is global moderate hypokinesis with dyskinesis of the inferior wall  Right ventricular function, chamber size, wall motion, and thickness are normal.  Abnormal septal motion consistent with left bundle branch block is present.  No significant valvular abnormalities present.    TTE 2/2022 (done at [facility] [place], [place])  Left ventricular ejection fraction is visually estimated at 55%.   No significant valvular abnormalities were identified.   Compared with the previous study dated 4/12/21, the left ventricular   function has significantly improved.     Labs:  Reviewed     Assessment and Plan:    In summary, Joel Resendiz is 57 year old M with NICM presenting for evaluation for advanced therapies.  He has systolic HF ACC AHA Stage C and NYHA Functional Class II sxs. He is on max doses of GDMT and appears to be tolerating them well. QRS is 188 ms wide LBBB on EKG. S/P CRT- D placement 11/2021.   Repeat TTE done on 2/2022 showed recovered LVEF 55%.   Currently appears euvolemic.       NICM with HFrEF LVEF 17% TTE 4/2021 ACC AHA Stage C NYHA Class II s/p CRT-D 11/2021, Recovered LVEF 55%  (2/2022)     Colonoscopy 5/2021- tubular adenoma in cecum    Sleep apnea compliant with CPAP    History Alcohol abuse     Former smoker    HFrEF GDMT & Regimen:   ACEi/ARB: entrestro   Role for Entresto: full dose entresto  97-103mg BID  BB: Toprol 100XL daily   Aldosterone antagonist: aldactone 25mg daily   SCD prophylaxis: CRT-D 11/2021    NSAID use: None, counseled/reminded  Smoking/Etoh/Drugs Counseling: done  Diet/Fluid Restriction Counseling: Done  Cardiac Rehab: completed  Need for advanced therapies/workup: Discussed continue to monitor with yearly follow-up    Plan:  -continue current medications   -yearly follow-up with advanced HF clinic   -continue routine follow-up with  and  at [facility]   -trial of PPI PRN     Follow-up in 1 year with labs     Patient evaluated with Dr.Alexy Dee Dougherty MD  Cardiology fellow     I have seen and evaluated the patient and agree with the assessment and plan as above  Jj Juarez MD

## 2022-03-22 ENCOUNTER — OFFICE VISIT (OUTPATIENT)
Dept: CARDIOLOGY | Facility: CLINIC | Age: 57
End: 2022-03-22
Attending: INTERNAL MEDICINE
Payer: COMMERCIAL

## 2022-03-22 ENCOUNTER — LAB (OUTPATIENT)
Dept: LAB | Facility: CLINIC | Age: 57
End: 2022-03-22
Payer: COMMERCIAL

## 2022-03-22 VITALS
DIASTOLIC BLOOD PRESSURE: 72 MMHG | WEIGHT: 202 LBS | OXYGEN SATURATION: 96 % | SYSTOLIC BLOOD PRESSURE: 113 MMHG | BODY MASS INDEX: 29.92 KG/M2 | HEART RATE: 74 BPM | HEIGHT: 69 IN

## 2022-03-22 DIAGNOSIS — I50.20 HEART FAILURE WITH REDUCED EJECTION FRACTION, NYHA CLASS III (H): ICD-10-CM

## 2022-03-22 DIAGNOSIS — I10 BENIGN ESSENTIAL HYPERTENSION: Primary | ICD-10-CM

## 2022-03-22 DIAGNOSIS — E78.2 MIXED HYPERLIPIDEMIA: ICD-10-CM

## 2022-03-22 DIAGNOSIS — I42.8 NONISCHEMIC CARDIOMYOPATHY (H): ICD-10-CM

## 2022-03-22 LAB
ALBUMIN SERPL-MCNC: 4 G/DL (ref 3.4–5)
ALP SERPL-CCNC: 64 U/L (ref 40–150)
ALT SERPL W P-5'-P-CCNC: 27 U/L (ref 0–70)
ANION GAP SERPL CALCULATED.3IONS-SCNC: 7 MMOL/L (ref 3–14)
AST SERPL W P-5'-P-CCNC: 18 U/L (ref 0–45)
BILIRUB SERPL-MCNC: 0.3 MG/DL (ref 0.2–1.3)
BUN SERPL-MCNC: 16 MG/DL (ref 7–30)
CALCIUM SERPL-MCNC: 9 MG/DL (ref 8.5–10.1)
CHLORIDE BLD-SCNC: 107 MMOL/L (ref 94–109)
CO2 SERPL-SCNC: 30 MMOL/L (ref 20–32)
CREAT SERPL-MCNC: 1.03 MG/DL (ref 0.66–1.25)
ERYTHROCYTE [DISTWIDTH] IN BLOOD BY AUTOMATED COUNT: 12.8 % (ref 10–15)
GFR SERPL CREATININE-BSD FRML MDRD: 85 ML/MIN/1.73M2
GLUCOSE BLD-MCNC: 75 MG/DL (ref 70–99)
HCT VFR BLD AUTO: 38.8 % (ref 40–53)
HGB BLD-MCNC: 12.8 G/DL (ref 13.3–17.7)
MCH RBC QN AUTO: 31.7 PG (ref 26.5–33)
MCHC RBC AUTO-ENTMCNC: 33 G/DL (ref 31.5–36.5)
MCV RBC AUTO: 96 FL (ref 78–100)
NT-PROBNP SERPL-MCNC: 70 PG/ML (ref 0–125)
PLATELET # BLD AUTO: 246 10E3/UL (ref 150–450)
POTASSIUM BLD-SCNC: 4 MMOL/L (ref 3.4–5.3)
PROT SERPL-MCNC: 7.7 G/DL (ref 6.8–8.8)
RBC # BLD AUTO: 4.04 10E6/UL (ref 4.4–5.9)
SODIUM SERPL-SCNC: 144 MMOL/L (ref 133–144)
WBC # BLD AUTO: 6.9 10E3/UL (ref 4–11)

## 2022-03-22 PROCEDURE — 36415 COLL VENOUS BLD VENIPUNCTURE: CPT | Performed by: PATHOLOGY

## 2022-03-22 PROCEDURE — 85027 COMPLETE CBC AUTOMATED: CPT | Performed by: PATHOLOGY

## 2022-03-22 PROCEDURE — G0463 HOSPITAL OUTPT CLINIC VISIT: HCPCS

## 2022-03-22 PROCEDURE — 80053 COMPREHEN METABOLIC PANEL: CPT | Performed by: PATHOLOGY

## 2022-03-22 PROCEDURE — 99214 OFFICE O/P EST MOD 30 MIN: CPT | Mod: GC | Performed by: INTERNAL MEDICINE

## 2022-03-22 PROCEDURE — 83880 ASSAY OF NATRIURETIC PEPTIDE: CPT | Performed by: PATHOLOGY

## 2022-03-22 RX ORDER — GARLIC 200 MG
TABLET ORAL
COMMUNITY

## 2022-03-22 ASSESSMENT — PAIN SCALES - GENERAL: PAINLEVEL: NO PAIN (0)

## 2022-03-22 NOTE — PATIENT INSTRUCTIONS
Dr. Juarez recommends:    No changes to medications.    Follow up clinic visit with Dr. Juarez in one year with labs same day.    Thank you for your visit today.  Please call me with any questions or concerns.   Padilla Knight RN  Cardiology Care Coordinator  [phone], press option 1 then option 4

## 2022-03-22 NOTE — NURSING NOTE
Chief Complaint   Patient presents with     Follow Up     Dr. Juarez: nonischemic cardiomyopathy     Vitals were taken and medications reconciled.    Yeison Horner, EMT  7:31 AM

## 2022-03-22 NOTE — LETTER
3/22/2022      RE: Joel Resendiz  [id number]  [facility] [id number]       Dear Colleague,    Thank you for the opportunity to participate in the care of your patient, Joel Resendiz, at the [facility] [place] at [facility]. Please see a copy of my visit note below.    [facility]   Clinic visit 3/22/2022    HPI:   Dear Colleagues,     We had the pleasure of seeing Joel Resendiz in the  [facility] Failure [facility].     As you know he has the following significant PMHx:     NICTIFFANIE with HFrEF LVEF 17% TTE 4/2021 ACC AHA Stage C NYHA Class II    Colonoscopy 5/2021- tubular adenoma in cecum    Sleep apnea compliant with CPAP    History Alcohol abuse     Former smoker    Cardiac Hx:   DARRICK initially dx Nov 2020- he had SOB x2-3 months couldn't walk dog <0.5 mile up a small hill next to his house, orthopnea- COVID neg.  TTE with LVEF 10% LVID 7.7cm.  LHC 11/23/20 mild to mod nonobstructive CAD with RHC mPAP 29, WP 19, CO 4.1.   cMRI 12/7/20 showed LVEF 13%, LVIDD 8.1cm, RV EF 27%, no LGE, 2+MR. EKG with wide LBBB QRS 192ms.  He was started on GDMT on Toprol 100mg bid, full dose Entresto, Aldactone 25mg daily. Other cardiac meds include lipitor 40 and baby aspirin.  TTE April 2021 showed LVEF 17% LVIDD 6.7cm. TTE in August 2021 showed LVEF 20-25%, LVIDD 5.9cm.   He was previously referred to EP Dr. Vaughn who he saw him in June 2021 and CRT-D was recommended but patient deferred it at that time.  He has been following with Dr. Barney at [facility] who referred him to us.     Current symptoms/issues:   He reports NYHA Class 2 sxs- he is able to walk his dog daily 30 minutes and climb several flights of stairs.   No palpitations, presyncope, or syncope.  Reported feeling better but continues to endorse occasional fatigue.   He also reported midline localized  chest discomfort which occurs spontaneously at rest and resolves with time. Noted to be relieved when he presses on it. Not associated with food or activity.   Had repeat TTE done at [facility] on 2022 which showed recovered LVEF     Social Hx:     Etoh: hx of alcohol abuse has been in treatment and quit for 6 years.  Before was drinking about 200-250mL of liquor daily for at least 5 years until dx with HF in 2020.     Smoking: quit in , 1pk would last a week/social smoker.    Other drugs/supplements: no    Work: works as an  but now in the [employer] department so very sedentary. 5-7k steps/day.    Family/Social support: , currently in a relationship for 4 years with live in . 1 son in college in [place], daughter in [place].    Family hx:   -mom 87 in nursing home-has CAD w/stents  -3 brothers  -dad passed- had CAD w/stents but  of cancer  -dad father  in his sleep in his 50s  -No hx of HF, premature CAD, SCD, stroke, DM.    CURRENT MEDICATIONS:    Current Outpatient Medications:      Ascorbic Acid (VITAMIN C) POWD, , Disp: , Rfl:      aspirin (ASA) 81 MG chewable tablet, Take 81 mg by mouth daily, Disp: , Rfl:      atorvastatin (LIPITOR) 40 MG tablet, Take 40 mg by mouth daily, Disp: , Rfl:      metoprolol succinate ER (TOPROL-XL) 100 MG 24 hr tablet, Take 100 mg by mouth daily , Disp: , Rfl:      sacubitril-valsartan (ENTRESTO)  MG per tablet, Take 1 tablet by mouth 2 times daily, Disp: , Rfl:      spironolactone (ALDACTONE) 25 MG tablet, Take 50 mg by mouth daily, Disp: , Rfl:     ROS:   Constitutional: No fever, chills, or sweats. Weight . + fatigue   ENT: No visual disturbance, ear ache, epistaxis, sore throat.   Allergies/Immunologic: Negative.   Respiratory: No cough, hemoptysis.   Cardiovascular: As per HPI.   GI: No nausea, vomiting, hematemesis, melena, or hematochezia.   : No urinary frequency, dysuria, or hematuria.   Integument: Negative.   Psychiatric: Negative.  "  Neuro: Negative.   Endocrinology: Negative.   Musculoskeletal: Negative.    EXAM:  /72 (BP Location: Right arm, Patient Position: Chair, Cuff Size: Adult Regular)   Pulse 74   Ht 1.763 m (5' 9.41\")   Wt 91.6 kg (202 lb)   SpO2 96%   BMI 29.48 kg/m     General: appears comfortable, alert and articulate  Head: normocephalic, atraumatic  Eyes: anicteric sclera, EOMI  Neck: no adenopathy  Orophyarynx: moist mucosa, no lesions, dentition intact  Heart: regular rate and rhythm, S1/S2 normal, no rub, estimated JVP 8 cm   Lungs: clear, no rales or wheezing  Abdomen: soft, non-tender, bowel sounds present, no hepatosplenomegaly  Extremities: no clubbing, cyanosis or edema  Neurological: normal speech and affect, no gross motor deficit    Relevant Tests/Imaging:     TTE 4/12/21  1. Moderate left ventricular dilation is present. LVEDd 6.7 cm. Normal   left ventricular wall thickness. Abnormal septal motion consistent   with left bundle branch block. Severe diffuse hypokinesis is present.   Left ventricular ejection fraction is visually estimated at 17%. Left   ventricular Doppler filling pattern consistent with Grade I (mild)   diastolic dysfunction.   2. Normal right ventricle size and normal global function.   3. No significant valve pathology was seen.   4. Moderate dilation of the aorta is present involving the sinuses of   Valsalva. (Maximal dimension 4.7 cm).   5. The inferior vena cava is small suggesting volume depletion.   6. There is no pericardial effusion.   7. Compared to the previous study done on 11/11/20, the left ventricle   has decreased in size but the left ventricular systolic function   remains severely depressed. The right ventricle has normalized in size   and function. There is less mitral and tricuspid regurgitation. The   central venous pressure has normalized. However, the sinus of Valsalva   of the aorta measures larger (4.2 cm --> 4.7 cm).     7D Freida Jan 2021  Findings   Patient " had a min HR of 41 bpm, max sinus HR of 178 bpm, and avg HR of 74 bpm.  Predominant underlying rhythm was Sinus Rhythm. First Degree AV Block was present. Bundle Branch Block/IVCD was present. QRS morphology changes were present throughout recording.   5 Ventricular Tachycardia runs occurred, the run with the fastest interval lasting 5 beats with a max rate of 179 bpm, the longest lasting 4 beats with an avg rate of 101 bpm.   Isolated SVEs were rare (<1.0%), SVE Couplets were rare (<1.0%), and SVE Triplets were rare (<1.0%).   Isolated VEs were rare (<1.0%, 231), VE Couplets were rare (<1.0%, 3), and VE Triplets were rare (<1.0%, 4). Ventricular Bigeminy was present.   Comment: Symptoms occurred in association with PVCs or ventricular ectopy.     Cardiac MRI 12/7/20  IMPRESSION:   1. The left ventricular systolic function was abnormal; calculated ejection fraction 13%.   Severe diffuse hypokinesis with abnormal septal motion consistent with LBBB.   2. Severe LV dilatation (LVIDD 81mm) with increased LV volumes.   3. Reduced RV systolic function. EF is calculated at 27%.   4. Delayed enhancement was not seen.  5. At least 2+ mitral regurgitation is seen.   6. Mild dilatation of Sinuses of Valsalva (42mm)   7. No LV clot seen.   Findings are consistent with non ischemic cardiomyopathy.     L+R Cath 11/23/20   Conclusions     1. Mild-to-moderate coronary artery atherosclerosis.     2. Mild pulmonary artery hypertension (mean PA = 29 mmHg), related to   elevated left atrial pressure.     3. Mild pulmonary capillary wedge hypertension (mean PCWP = 19 mmHg).     4. Mildly reduced cardiac output (thermodilution) of 4.1 L/min (CI = 2.0   L/min/m2).     5. Nonischemic cardiomyopathy.   Diagnostic Findings     * Coronary angiography shows right dominance.     * Left Main has no disease.     * Proximal Left Anterior Descending: minimal  30% stenosis, NAIF: 3 flow.     * Mid Left Anterior Descending: minimal  30% stenosis,  NAIF: 3 flow.     * Proximal Circumflex: luminal irregularities, NAIF: 3 flow.     * Mid Circumflex: mild  40% stenosis, NAIF: 3 flow.     * Proximal Right Coronary Artery: minimal  30% stenosis, NAIF: 3 flow.     * Mid Right Coronary Artery: mild  40% stenosis, NAIF: 3 flow.     Cath Hemodynamic Data       Phase:Baseline       AO :  87 mmHg / 67 mmHg ( 59 mmHg )        RV :  39 mmHg / 3 mmHg / 11 mmHg )      PA :  42 mmHg / 21 mmHg ( 29 mmHg )       RA :  a wave = 32,767 mmHg v wave = 9 mmHg mean = 7 mmHg        PCW :  a wave = 32,767 mmHg v wave = 26 mmHg mean = 19 mmHg              a wave = 32,767 mmHg v wave = 23 mmHg mean = 19 mmHg        AO HR:  77 bpm    Saturations       Phase:Baseline       AO :  99.60 %         PA :  73.40 %               73.40 %         PCW :  97.90 %    VO2 Content       Phase:Baseline       VO2 :  249.25 ml/min    Resistance Results (Wood Units)       Phase:Baseline       PVR :  1.71 DYER        SVR :  9.51 DYER    Cardiac Output       Phase:Baseline       Samuel :  5.46 l/min        Thermo Dilution :  4 l/min        Samuel Cardiac Index:  2.69 L/min/m2        Thermo Dilution Cardiac Index:  2 L/min/m2      TTE 11/11/2020  CONCLUSIONS   # Severe left ventricular dilation is present. LVEDd 7.7 cm. Severe   diffuse hypokinesis is present. Left ventricular ejection fraction is   visually estimated at 10%.   # Increased trabeculation is seen in the apex, but no clear thrombus   on the contrast enhanced pictures.   # Right ventricular dilation is present. Basal diameter is 5.1 cm.   Global right ventricular systolic function is moderately reduced.   # Bi atrial enlargement.   # Mild to moderate mitral regurgitation.   # Pulmonary artery systolic pressure estimate is 26 mmHg above   RAP(Normal) .   # Mild dilation of the aorta is present involving the sinuses of   Valsalva. (Maximal dimension 4.2 cm).   # Dilation of the inferior vena cava (> 2.1 cm) with abnormal   respiratory variation in diameter.  Estimated right atrial pressure is   15 mmHg .   # There is no pericardial effusion.   No previous study available for comparison.   Given the above abnormalities, the patient is scheduled for urgent  cardiology consult later today.      Lexiscan 11/11/20  Summary     1. Myocardial perfusion imaging is abnormal.     2. A regadenoson infusion pharmacologic stress test was performed.     3. The patient experienced no symptoms during the stress test.     4. Nondiagnostic stress ECG due to left bundle branch block.     5. Severely enlarged left ventricular size.     6. There was a large, medium, predominantly fixed myocardial perfusion   defect of the inferior septum. There is also a small defect in the apex with some degree of reversibility.     7. Abnormal left ventricular systolic function. Calculated LVEF 9 %.     8. Based on this study, the annual cardiovascular mortality rate is high (greater than 3%).     9. Findings suggest non ischemic cardiomyopathy as the degree of perfusion defect is out of proportion to the LV dysfunction.     TTE 8/2021   The visual ejection fraction is 20-25% with moderate dilation (LVEDd 5.9cm).  Left ventricular wall thickness is normal. There is global moderate hypokinesis with dyskinesis of the inferior wall  Right ventricular function, chamber size, wall motion, and thickness are normal.  Abnormal septal motion consistent with left bundle branch block is present.  No significant valvular abnormalities present.    TTE 2/2022 (done at [facility])  Left ventricular ejection fraction is visually estimated at 55%.   No significant valvular abnormalities were identified.   Compared with the previous study dated 4/12/21, the left ventricular   function has significantly improved.     Labs:  Reviewed     Assessment and Plan:    In summary, Joel Resendiz is 57 year old M with NICM presenting for evaluation for advanced therapies.  He has systolic HF ACC AHA Stage C and NYHA Functional  Class II sxs. He is on max doses of GDMT and appears to be tolerating them well. QRS is 188 ms wide LBBB on EKG. S/P CRT- D placement 11/2021.   Repeat TTE done on 2/2022 showed recovered LVEF 55%.   Currently appears euvolemic.       NICM with HFrEF LVEF 17% TTE 4/2021 ACC AHA Stage C NYHA Class II s/p CRT-D 11/2021, Recovered LVEF 55% (2/2022)     Colonoscopy 5/2021- tubular adenoma in cecum    Sleep apnea compliant with CPAP    History Alcohol abuse     Former smoker    HFrEF GDMT & Regimen:   ACEi/ARB: entrestro   Role for Entresto: full dose entresto  97-103mg BID  BB: Toprol 100XL daily   Aldosterone antagonist: aldactone 25mg daily   SCD prophylaxis: CRT-D 11/2021    NSAID use: None, counseled/reminded  Smoking/Etoh/Drugs Counseling: done  Diet/Fluid Restriction Counseling: Done  Cardiac Rehab: completed  Need for advanced therapies/workup: Discussed continue to monitor with yearly follow-up    Plan:  -continue current medications   -yearly follow-up with advanced HF clinic   -continue routine follow-up with  and  at [facility]   -trial of PPI PRN     Follow-up in 1 year with labs     Patient evaluated with Dr.Alexy Dee Dougherty MD  Cardiology fellow     I have seen and evaluated the patient and agree with the assessment and plan as above  Jj Juarez MD  \

## 2022-10-16 ENCOUNTER — HEALTH MAINTENANCE LETTER (OUTPATIENT)
Age: 57
End: 2022-10-16

## 2022-12-01 ENCOUNTER — TELEPHONE (OUTPATIENT)
Dept: CARDIOLOGY | Facility: CLINIC | Age: 57
End: 2022-12-01

## 2023-02-28 ENCOUNTER — OFFICE VISIT (OUTPATIENT)
Dept: CARDIOLOGY | Facility: CLINIC | Age: 58
End: 2023-02-28
Attending: INTERNAL MEDICINE
Payer: COMMERCIAL

## 2023-02-28 ENCOUNTER — LAB (OUTPATIENT)
Dept: LAB | Facility: CLINIC | Age: 58
End: 2023-02-28
Payer: COMMERCIAL

## 2023-02-28 VITALS
DIASTOLIC BLOOD PRESSURE: 69 MMHG | OXYGEN SATURATION: 97 % | HEART RATE: 63 BPM | WEIGHT: 201.6 LBS | HEIGHT: 69 IN | SYSTOLIC BLOOD PRESSURE: 107 MMHG | BODY MASS INDEX: 29.86 KG/M2

## 2023-02-28 DIAGNOSIS — E78.2 MIXED HYPERLIPIDEMIA: ICD-10-CM

## 2023-02-28 DIAGNOSIS — I10 BENIGN ESSENTIAL HYPERTENSION: ICD-10-CM

## 2023-02-28 DIAGNOSIS — I42.8 NONISCHEMIC CARDIOMYOPATHY (H): ICD-10-CM

## 2023-02-28 DIAGNOSIS — I47.10 SVT (SUPRAVENTRICULAR TACHYCARDIA) (H): ICD-10-CM

## 2023-02-28 DIAGNOSIS — I50.20 HEART FAILURE WITH REDUCED EJECTION FRACTION, NYHA CLASS III (H): ICD-10-CM

## 2023-02-28 DIAGNOSIS — I50.20 HEART FAILURE WITH REDUCED EJECTION FRACTION, NYHA CLASS III (H): Primary | ICD-10-CM

## 2023-02-28 LAB
ALBUMIN SERPL BCG-MCNC: 4.5 G/DL (ref 3.5–5.2)
ALP SERPL-CCNC: 60 U/L (ref 40–129)
ALT SERPL W P-5'-P-CCNC: 24 U/L (ref 10–50)
ANION GAP SERPL CALCULATED.3IONS-SCNC: 10 MMOL/L (ref 7–15)
AST SERPL W P-5'-P-CCNC: 25 U/L (ref 10–50)
BASOPHILS # BLD AUTO: 0 10E3/UL (ref 0–0.2)
BASOPHILS NFR BLD AUTO: 1 %
BILIRUB SERPL-MCNC: 0.2 MG/DL
BUN SERPL-MCNC: 12.1 MG/DL (ref 6–20)
CALCIUM SERPL-MCNC: 9.3 MG/DL (ref 8.6–10)
CHLORIDE SERPL-SCNC: 105 MMOL/L (ref 98–107)
CREAT SERPL-MCNC: 1.09 MG/DL (ref 0.67–1.17)
DEPRECATED HCO3 PLAS-SCNC: 27 MMOL/L (ref 22–29)
EOSINOPHIL # BLD AUTO: 0.3 10E3/UL (ref 0–0.7)
EOSINOPHIL NFR BLD AUTO: 6 %
ERYTHROCYTE [DISTWIDTH] IN BLOOD BY AUTOMATED COUNT: 12.7 % (ref 10–15)
GFR SERPL CREATININE-BSD FRML MDRD: 79 ML/MIN/1.73M2
GLUCOSE SERPL-MCNC: 79 MG/DL (ref 70–99)
HCT VFR BLD AUTO: 37.7 % (ref 40–53)
HGB BLD-MCNC: 12.5 G/DL (ref 13.3–17.7)
IMM GRANULOCYTES # BLD: 0 10E3/UL
IMM GRANULOCYTES NFR BLD: 0 %
LYMPHOCYTES # BLD AUTO: 1.5 10E3/UL (ref 0.8–5.3)
LYMPHOCYTES NFR BLD AUTO: 25 %
MCH RBC QN AUTO: 31.3 PG (ref 26.5–33)
MCHC RBC AUTO-ENTMCNC: 33.2 G/DL (ref 31.5–36.5)
MCV RBC AUTO: 95 FL (ref 78–100)
MONOCYTES # BLD AUTO: 0.6 10E3/UL (ref 0–1.3)
MONOCYTES NFR BLD AUTO: 10 %
NEUTROPHILS # BLD AUTO: 3.4 10E3/UL (ref 1.6–8.3)
NEUTROPHILS NFR BLD AUTO: 58 %
NRBC # BLD AUTO: 0 10E3/UL
NRBC BLD AUTO-RTO: 0 /100
NT-PROBNP SERPL-MCNC: 52 PG/ML (ref 0–900)
PLATELET # BLD AUTO: 231 10E3/UL (ref 150–450)
POTASSIUM SERPL-SCNC: 4.1 MMOL/L (ref 3.4–5.3)
PROT SERPL-MCNC: 7.4 G/DL (ref 6.4–8.3)
RBC # BLD AUTO: 3.99 10E6/UL (ref 4.4–5.9)
SODIUM SERPL-SCNC: 142 MMOL/L (ref 136–145)
WBC # BLD AUTO: 5.9 10E3/UL (ref 4–11)

## 2023-02-28 PROCEDURE — 36415 COLL VENOUS BLD VENIPUNCTURE: CPT | Performed by: PATHOLOGY

## 2023-02-28 PROCEDURE — 80053 COMPREHEN METABOLIC PANEL: CPT | Performed by: PATHOLOGY

## 2023-02-28 PROCEDURE — G0463 HOSPITAL OUTPT CLINIC VISIT: HCPCS | Performed by: INTERNAL MEDICINE

## 2023-02-28 PROCEDURE — 83880 ASSAY OF NATRIURETIC PEPTIDE: CPT | Performed by: PATHOLOGY

## 2023-02-28 PROCEDURE — 85025 COMPLETE CBC W/AUTO DIFF WBC: CPT | Performed by: PATHOLOGY

## 2023-02-28 PROCEDURE — G0463 HOSPITAL OUTPT CLINIC VISIT: HCPCS

## 2023-02-28 PROCEDURE — 99214 OFFICE O/P EST MOD 30 MIN: CPT | Mod: GC | Performed by: INTERNAL MEDICINE

## 2023-02-28 ASSESSMENT — PAIN SCALES - GENERAL: PAINLEVEL: NO PAIN (0)

## 2023-02-28 NOTE — PROGRESS NOTES
"[facility]   2/28/2023    HPI: Joel Resendiz is a 57 year old male seen in the [facility]. He has the following significant PMHx:     NICM with HF recovered EF LVEF 55% TTE 2/2022 ACC AHA Stage C NYHA Class II    Colonoscopy 5/2021- tubular adenoma in cecum    Sleep apnea compliant with CPAP    History Alcohol use disorder    Former smoker    Cardiac Hx:   NICM initially dx Nov 2020- he had SOB x2-3 months couldn't walk dog <0.5 mile up a small hill next to his house, orthopnea- COVID neg.  TTE with LVEF 10% LVID 7.7cm.  LHC 11/23/20 mild to mod nonobstructive CAD with RHC mPAP 29, WP 19, CO 4.1.   cMRI 12/7/20 showed LVEF 13%, LVIDD 8.1cm, RV EF 27%, no LGE, 2+MR. EKG with wide LBBB QRS 192ms.  He was started on GDMT on Toprol 100mg bid, full dose Entresto, Aldactone 25mg daily. Other cardiac meds include lipitor 40 and baby aspirin.  TTE April 2021 showed LVEF 17% LVIDD 6.7cm. TTE in August 2021 showed LVEF 20-25%, LVIDD 5.9cm.   He was previously referred to EP Dr. Vaughn who he saw him in June 2021 and CRT-D was recommended but patient deferred it at that time.  He has been following with Dr. Barney at [facility] who referred him to us.     February 28, 2023 visit:   Joel is \"feeling good\" today. In the past year, he doesn't think he \"gets the stamina back\", but he no longer experiences dyspnea with ordinary activities of his life. Now, he can shovel a small amount of snow without becoming dyspneic. He walks the dog daily, average between 5k-6k steps (measured by his Apple watch). He can ascend stairs without substantial limitations. Still experiences non-exertional chest discomfort, but he confirms this is similar pain to what he has been experiencing for 20 years. He thinks it may be related to fizzy drink intake. Otherwise denies dizziness or edema.     Social Hx:     Etoh: hx of alcohol abuse has been in " "treatment and quit for 6 years.  Before was drinking about 200-250mL of liquor daily for at least 5 years until dx with HF in 2020.     Smoking: quit in , 1pk would last a week/social smoker.    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 5-7k steps/day.    Family/Social support: , currently in a relationship for 4 years with live in . 1 son in college in [place], daughter in [place].    Family hx:   -mom 87 in nursing home-has CAD w/stents  -3 brothers  -dad passed- had CAD w/stents but  of cancer  -dad father  in his sleep in his 50s  -No hx of HF, premature CAD, SCD, stroke, DM.    CURRENT MEDICATIONS:    Current Outpatient Medications:      Ascorbic Acid (VITAMIN C) POWD, , Disp: , Rfl:      aspirin (ASA) 81 MG chewable tablet, Take 81 mg by mouth daily, Disp: , Rfl:      atorvastatin (LIPITOR) 40 MG tablet, Take 40 mg by mouth daily, Disp: , Rfl:      metoprolol succinate ER (TOPROL-XL) 100 MG 24 hr tablet, Take 100 mg by mouth daily , Disp: , Rfl:      sacubitril-valsartan (ENTRESTO)  MG per tablet, Take 1 tablet by mouth 2 times daily, Disp: , Rfl:      spironolactone (ALDACTONE) 25 MG tablet, Take 50 mg by mouth daily, Disp: , Rfl:     ROS:   Constitutional: No fever, chills, or sweats. Weight . + fatigue   ENT: No visual disturbance, ear ache, epistaxis, sore throat.   Allergies/Immunologic: Negative.   Respiratory: No cough, hemoptysis.   Cardiovascular: As per HPI.   GI: No nausea, vomiting, hematemesis, melena, or hematochezia.   : No urinary frequency, dysuria, or hematuria.   Integument: Negative.   Psychiatric: Negative.   Neuro: Negative.   Endocrinology: Negative.   Musculoskeletal: Negative.    EXAM:  /69 (BP Location: Right arm, Patient Position: Chair, Cuff Size: Adult Regular)   Pulse 63   Ht 1.74 m (5' 8.5\")   Wt 91.4 kg (201 lb 9.6 oz)   SpO2 97%   BMI 30.21 kg/m     General: appears " comfortable, alert and articulate  Head: normocephalic, atraumatic  Eyes: anicteric sclera, EOMI  Neck: no adenopathy  Orophyarynx: moist mucosa, no lesions, dentition intact  Heart: regular rate and rhythm, S1/S2 normal, no rub, estimated JVP 8 cm   Lungs: clear, no rales or wheezing  Abdomen: soft, non-tender, bowel sounds present, no hepatosplenomegaly  Extremities: no clubbing, cyanosis or edema  Neurological: normal speech and affect, no gross motor deficit    Relevant Tests/Imaging:     TTE 4/12/21  1. Moderate left ventricular dilation is present. LVEDd 6.7 cm. Normal   left ventricular wall thickness. Abnormal septal motion consistent   with left bundle branch block. Severe diffuse hypokinesis is present.   Left ventricular ejection fraction is visually estimated at 17%. Left   ventricular Doppler filling pattern consistent with Grade I (mild)   diastolic dysfunction.   2. Normal right ventricle size and normal global function.   3. No significant valve pathology was seen.   4. Moderate dilation of the aorta is present involving the sinuses of   Valsalva. (Maximal dimension 4.7 cm).   5. The inferior vena cava is small suggesting volume depletion.   6. There is no pericardial effusion.   7. Compared to the previous study done on 11/11/20, the left ventricle   has decreased in size but the left ventricular systolic function   remains severely depressed. The right ventricle has normalized in size   and function. There is less mitral and tricuspid regurgitation. The   central venous pressure has normalized. However, the sinus of Valsalva   of the aorta measures larger (4.2 cm --> 4.7 cm).     7D [facility] Jan 2021  Findings   Patient had a min HR of 41 bpm, max sinus HR of 178 bpm, and avg HR of 74 bpm.  Predominant underlying rhythm was Sinus Rhythm. First Degree AV Block was present. Bundle Branch Block/IVCD was present. QRS morphology changes were present throughout recording.   5 Ventricular Tachycardia runs  occurred, the run with the fastest interval lasting 5 beats with a max rate of 179 bpm, the longest lasting 4 beats with an avg rate of 101 bpm.   Isolated SVEs were rare (<1.0%), SVE Couplets were rare (<1.0%), and SVE Triplets were rare (<1.0%).   Isolated VEs were rare (<1.0%, 231), VE Couplets were rare (<1.0%, 3), and VE Triplets were rare (<1.0%, 4). Ventricular Bigeminy was present.   Comment: Symptoms occurred in association with PVCs or ventricular ectopy.     Cardiac MRI 12/7/20  IMPRESSION:   1. The left ventricular systolic function was abnormal; calculated ejection fraction 13%.   Severe diffuse hypokinesis with abnormal septal motion consistent with LBBB.   2. Severe LV dilatation (LVIDD 81mm) with increased LV volumes.   3. Reduced RV systolic function. EF is calculated at 27%.   4. Delayed enhancement was not seen.  5. At least 2+ mitral regurgitation is seen.   6. Mild dilatation of Sinuses of Valsalva (42mm)   7. No LV clot seen.   Findings are consistent with non ischemic cardiomyopathy.     L+R Cath 11/23/20   Conclusions     1. Mild-to-moderate coronary artery atherosclerosis.     2. Mild pulmonary artery hypertension (mean PA = 29 mmHg), related to   elevated left atrial pressure.     3. Mild pulmonary capillary wedge hypertension (mean PCWP = 19 mmHg).     4. Mildly reduced cardiac output (thermodilution) of 4.1 L/min (CI = 2.0   L/min/m2).     5. Nonischemic cardiomyopathy.   Diagnostic Findings     * Coronary angiography shows right dominance.     * Left Main has no disease.     * Proximal Left Anterior Descending: minimal  30% stenosis, NAIF: 3 flow.     * Mid Left Anterior Descending: minimal  30% stenosis, NAIF: 3 flow.     * Proximal Circumflex: luminal irregularities, NAIF: 3 flow.     * Mid Circumflex: mild  40% stenosis, NAIF: 3 flow.     * Proximal Right Coronary Artery: minimal  30% stenosis, NAIF: 3 flow.     * Mid Right Coronary Artery: mild  40% stenosis, NAIF: 3 flow.     Cath  Hemodynamic Data       Phase:Baseline       AO :  87 mmHg / 67 mmHg ( 59 mmHg )        RV :  39 mmHg / 3 mmHg / 11 mmHg )      PA :  42 mmHg / 21 mmHg ( 29 mmHg )       RA :  a wave = 32,767 mmHg v wave = 9 mmHg mean = 7 mmHg        PCW :  a wave = 32,767 mmHg v wave = 26 mmHg mean = 19 mmHg              a wave = 32,767 mmHg v wave = 23 mmHg mean = 19 mmHg        AO HR:  77 bpm    Saturations       Phase:Baseline       AO :  99.60 %         PA :  73.40 %               73.40 %         PCW :  97.90 %    VO2 Content       Phase:Baseline       VO2 :  249.25 ml/min    Resistance Results (Wood Units)       Phase:Baseline       PVR :  1.71 DYER        SVR :  9.51 DYER    Cardiac Output       Phase:Baseline       Samuel :  5.46 l/min        Thermo Dilution :  4 l/min        Samuel Cardiac Index:  2.69 L/min/m2        Thermo Dilution Cardiac Index:  2 L/min/m2      TTE 11/11/2020  CONCLUSIONS   # Severe left ventricular dilation is present. LVEDd 7.7 cm. Severe   diffuse hypokinesis is present. Left ventricular ejection fraction is   visually estimated at 10%.   # Increased trabeculation is seen in the apex, but no clear thrombus   on the contrast enhanced pictures.   # Right ventricular dilation is present. Basal diameter is 5.1 cm.   Global right ventricular systolic function is moderately reduced.   # Bi atrial enlargement.   # Mild to moderate mitral regurgitation.   # Pulmonary artery systolic pressure estimate is 26 mmHg above   RAP(Normal) .   # Mild dilation of the aorta is present involving the sinuses of   Valsalva. (Maximal dimension 4.2 cm).   # Dilation of the inferior vena cava (> 2.1 cm) with abnormal   respiratory variation in diameter. Estimated right atrial pressure is   15 mmHg .   # There is no pericardial effusion.   No previous study available for comparison.   Given the above abnormalities, the patient is scheduled for urgent  cardiology consult later today.      Lexiscan 11/11/20  Summary     1. Myocardial  perfusion imaging is abnormal.     2. A regadenoson infusion pharmacologic stress test was performed.     3. The patient experienced no symptoms during the stress test.     4. Nondiagnostic stress ECG due to left bundle branch block.     5. Severely enlarged left ventricular size.     6. There was a large, medium, predominantly fixed myocardial perfusion   defect of the inferior septum. There is also a small defect in the apex with some degree of reversibility.     7. Abnormal left ventricular systolic function. Calculated LVEF 9 %.     8. Based on this study, the annual cardiovascular mortality rate is high (greater than 3%).     9. Findings suggest non ischemic cardiomyopathy as the degree of perfusion defect is out of proportion to the LV dysfunction.     TTE 8/2021   The visual ejection fraction is 20-25% with moderate dilation (LVEDd 5.9cm).  Left ventricular wall thickness is normal. There is global moderate hypokinesis with dyskinesis of the inferior wall  Right ventricular function, chamber size, wall motion, and thickness are normal.  Abnormal septal motion consistent with left bundle branch block is present.  No significant valvular abnormalities present.    TTE 2/2022 (done at [facility])  Left ventricular ejection fraction is visually estimated at 55%.   No significant valvular abnormalities were identified.   Compared with the previous study dated 4/12/21, the left ventricular   function has significantly improved.     TTE 2/2023 (done at [facility])  Left ventricular ejection fraction is visually estimated at 55%.   No significant valvular abnormalities were identified.   Dilation of the aorta is present involving the sinuses of Valsalva.   (Maximal dimension 4.4 cm).   Compared with the previous study dated 2/22/22, there has been no   significant change.       Labs:  Reviewed, no significant changes today     Assessment and Plan:   57 year old M with NICM initially referred for evaluation for advanced  therapies.  He has nonischemic cardiomyopathy, ACC/AHA Stage C and NYHA Functional Class II sxs. He is on GDMT and appears to be tolerating them well. QRS is 188 ms wide LBBB on EKG,  S/P CRT- D placement 11/2021. Repeat TTE done on 2/2022 showed recovered LVEF 55%. Currently appears euvolemic.       NICM with HFrEF LVEF 17% TTE 4/2021 ACC AHA Stage C NYHA Class II s/p CRT-D 11/2021, Recovered LVEF 55% (2/2022)     Colonoscopy 5/2021- tubular adenoma in cecum    Sleep apnea compliant with CPAP    History Alcohol use disorder    Former smoker    HFrEF GDMT & Regimen:   ACEi/ARB: entrestro   Role for Entresto: full dose entresto  97-103mg BID  BB: Toprol XL 50mg BID   Aldosterone antagonist: aldactone 25mg daily   SCD prophylaxis: CRT-D 11/2021    NSAID use: None, counseled/reminded  Smoking/Etoh/Drugs Counseling: done  Diet/Fluid Restriction Counseling: Done  Cardiac Rehab: completed  Need for advanced therapies/workup: Discussed continue to monitor with yearly follow-up    Plan:  -continue current medications, recommend maintain curreng GDMT (per TRED-HF trial) because it has been effective  -continue routine follow-up with  and  at [facility]   -continue [facility] HF clinic follow-up in 1 year     The patient was seen and discussed with attending physician, Dr. Jj Juarez MD.     Serg Fairbanks MD, PhD  Cardiology Fellow  Click to text page [phone]    I have seen and evaluated the patient and agree with the assessment and plan as above. Patient with recovered LV function, LVEDD amazingly decreased from 8cm to just above 4 cm. He did have 2 episodes of SVT, last lasting for 10 minutes, rate in the 180s. May need to adjust metoprolol if recurrence,    Jj Juarez MD

## 2023-02-28 NOTE — PATIENT INSTRUCTIONS
Dr. Juarez recommends:    Follow up clinic visit with Dr. Juarez in one year with labs the same day.    Thank you for your visit today.  Please call me with any questions or concerns.   Padilla Knight RN  Cardiology Care Coordinator  [phone]

## 2023-02-28 NOTE — LETTER
"2/28/2023      RE: Joel Resendiz  [address]  [facility] [address]       Dear Colleague,    Thank you for the opportunity to participate in the care of your patient, Joel Resendiz, at the [facility] [place] [facility]. Please see a copy of my visit note below.    [facility]   2/28/2023    HPI: Joel Resendiz is a 57 year old male seen in the [facility]. He has the following significant PMHx:     NICM with HF recovered EF LVEF 55% TTE 2/2022 ACC AHA Stage C NYHA Class II    Colonoscopy 5/2021- tubular adenoma in cecum    Sleep apnea compliant with CPAP    History Alcohol use disorder    Former smoker    Cardiac Hx:   NICM initially dx Nov 2020- he had SOB x2-3 months couldn't walk dog <0.5 mile up a small hill next to his house, orthopnea- COVID neg.  TTE with LVEF 10% LVID 7.7cm.  LHC 11/23/20 mild to mod nonobstructive CAD with RHC mPAP 29, WP 19, CO 4.1.   cMRI 12/7/20 showed LVEF 13%, LVIDD 8.1cm, RV EF 27%, no LGE, 2+MR. EKG with wide LBBB QRS 192ms.  He was started on GDMT on Toprol 100mg bid, full dose Entresto, Aldactone 25mg daily. Other cardiac meds include lipitor 40 and baby aspirin.  TTE April 2021 showed LVEF 17% LVIDD 6.7cm. TTE in August 2021 showed LVEF 20-25%, LVIDD 5.9cm.   He was previously referred to EP Dr. Vaughn who he saw him in June 2021 and CRT-D was recommended but patient deferred it at that time.  He has been following with Dr. Barney at [facility] who referred him to us.     February 28, 2023 visit:   Joel is \"feeling good\" today. In the past year, he doesn't think he \"gets the stamina back\", but he no longer experiences dyspnea with ordinary activities of his life. Now, he can shovel a small amount of snow without becoming dyspneic. He walks the dog daily, average between 5k-6k steps (measured by his " Apple watch). He can ascend stairs without substantial limitations. Still experiences non-exertional chest discomfort, but he confirms this is similar pain to what he has been experiencing for 20 years. He thinks it may be related to fizzy drink intake. Otherwise denies dizziness or edema.     Social Hx:     Etoh: hx of alcohol abuse has been in treatment and quit for 6 years.  Before was drinking about 200-250mL of liquor daily for at least 5 years until dx with HF in 2020.     Smoking: quit in , 1pk would last a week/social smoker.    Other drugs/supplements: no    Work: works as an  but now in the [employer] department so very sedentary. 5-7k steps/day.    Family/Social support: , currently in a relationship for 4 years with live in . 1 son in college in [place], daughter in [place].    Family hx:   -mom 87 in nursing home-has CAD w/stents  -3 brothers  -dad passed- had CAD w/stents but  of cancer  -dad father  in his sleep in his 50s  -No hx of HF, premature CAD, SCD, stroke, DM.    CURRENT MEDICATIONS:    Current Outpatient Medications:      Ascorbic Acid (VITAMIN C) POWD, , Disp: , Rfl:      aspirin (ASA) 81 MG chewable tablet, Take 81 mg by mouth daily, Disp: , Rfl:      atorvastatin (LIPITOR) 40 MG tablet, Take 40 mg by mouth daily, Disp: , Rfl:      metoprolol succinate ER (TOPROL-XL) 100 MG 24 hr tablet, Take 100 mg by mouth daily , Disp: , Rfl:      sacubitril-valsartan (ENTRESTO)  MG per tablet, Take 1 tablet by mouth 2 times daily, Disp: , Rfl:      spironolactone (ALDACTONE) 25 MG tablet, Take 50 mg by mouth daily, Disp: , Rfl:     ROS:   Constitutional: No fever, chills, or sweats. Weight . + fatigue   ENT: No visual disturbance, ear ache, epistaxis, sore throat.   Allergies/Immunologic: Negative.   Respiratory: No cough, hemoptysis.   Cardiovascular: As per HPI.   GI: No nausea, vomiting, hematemesis, melena, or hematochezia.   : No urinary frequency, dysuria, or  "hematuria.   Integument: Negative.   Psychiatric: Negative.   Neuro: Negative.   Endocrinology: Negative.   Musculoskeletal: Negative.    EXAM:  /69 (BP Location: Right arm, Patient Position: Chair, Cuff Size: Adult Regular)   Pulse 63   Ht 1.74 m (5' 8.5\")   Wt 91.4 kg (201 lb 9.6 oz)   SpO2 97%   BMI 30.21 kg/m     General: appears comfortable, alert and articulate  Head: normocephalic, atraumatic  Eyes: anicteric sclera, EOMI  Neck: no adenopathy  Orophyarynx: moist mucosa, no lesions, dentition intact  Heart: regular rate and rhythm, S1/S2 normal, no rub, estimated JVP 8 cm   Lungs: clear, no rales or wheezing  Abdomen: soft, non-tender, bowel sounds present, no hepatosplenomegaly  Extremities: no clubbing, cyanosis or edema  Neurological: normal speech and affect, no gross motor deficit    Relevant Tests/Imaging:     TTE 4/12/21  1. Moderate left ventricular dilation is present. LVEDd 6.7 cm. Normal   left ventricular wall thickness. Abnormal septal motion consistent   with left bundle branch block. Severe diffuse hypokinesis is present.   Left ventricular ejection fraction is visually estimated at 17%. Left   ventricular Doppler filling pattern consistent with Grade I (mild)   diastolic dysfunction.   2. Normal right ventricle size and normal global function.   3. No significant valve pathology was seen.   4. Moderate dilation of the aorta is present involving the sinuses of   Valsalva. (Maximal dimension 4.7 cm).   5. The inferior vena cava is small suggesting volume depletion.   6. There is no pericardial effusion.   7. Compared to the previous study done on 11/11/20, the left ventricle   has decreased in size but the left ventricular systolic function   remains severely depressed. The right ventricle has normalized in size   and function. There is less mitral and tricuspid regurgitation. The   central venous pressure has normalized. However, the sinus of Valsalva   of the aorta measures larger " (4.2 cm --> 4.7 cm).     7D [place] Jan 2021  Findings   Patient had a min HR of 41 bpm, max sinus HR of 178 bpm, and avg HR of 74 bpm.  Predominant underlying rhythm was Sinus Rhythm. First Degree AV Block was present. Bundle Branch Block/IVCD was present. QRS morphology changes were present throughout recording.   5 Ventricular Tachycardia runs occurred, the run with the fastest interval lasting 5 beats with a max rate of 179 bpm, the longest lasting 4 beats with an avg rate of 101 bpm.   Isolated SVEs were rare (<1.0%), SVE Couplets were rare (<1.0%), and SVE Triplets were rare (<1.0%).   Isolated VEs were rare (<1.0%, 231), VE Couplets were rare (<1.0%, 3), and VE Triplets were rare (<1.0%, 4). Ventricular Bigeminy was present.   Comment: Symptoms occurred in association with PVCs or ventricular ectopy.     Cardiac MRI 12/7/20  IMPRESSION:   1. The left ventricular systolic function was abnormal; calculated ejection fraction 13%.   Severe diffuse hypokinesis with abnormal septal motion consistent with LBBB.   2. Severe LV dilatation (LVIDD 81mm) with increased LV volumes.   3. Reduced RV systolic function. EF is calculated at 27%.   4. Delayed enhancement was not seen.  5. At least 2+ mitral regurgitation is seen.   6. Mild dilatation of Sinuses of Valsalva (42mm)   7. No LV clot seen.   Findings are consistent with non ischemic cardiomyopathy.     L+R Cath 11/23/20   Conclusions     1. Mild-to-moderate coronary artery atherosclerosis.     2. Mild pulmonary artery hypertension (mean PA = 29 mmHg), related to   elevated left atrial pressure.     3. Mild pulmonary capillary wedge hypertension (mean PCWP = 19 mmHg).     4. Mildly reduced cardiac output (thermodilution) of 4.1 L/min (CI = 2.0   L/min/m2).     5. Nonischemic cardiomyopathy.   Diagnostic Findings     * Coronary angiography shows right dominance.     * Left Main has no disease.     * Proximal Left Anterior Descending: minimal  30% stenosis, NAIF: 3  flow.     * Mid Left Anterior Descending: minimal  30% stenosis, NAIF: 3 flow.     * Proximal Circumflex: luminal irregularities, NAIF: 3 flow.     * Mid Circumflex: mild  40% stenosis, NAIF: 3 flow.     * Proximal Right Coronary Artery: minimal  30% stenosis, NAIF: 3 flow.     * Mid Right Coronary Artery: mild  40% stenosis, NAIF: 3 flow.     Cath Hemodynamic Data       Phase:Baseline       AO :  87 mmHg / 67 mmHg ( 59 mmHg )        RV :  39 mmHg / 3 mmHg / 11 mmHg )      PA :  42 mmHg / 21 mmHg ( 29 mmHg )       RA :  a wave = 32,767 mmHg v wave = 9 mmHg mean = 7 mmHg        PCW :  a wave = 32,767 mmHg v wave = 26 mmHg mean = 19 mmHg              a wave = 32,767 mmHg v wave = 23 mmHg mean = 19 mmHg        AO HR:  77 bpm    Saturations       Phase:Baseline       AO :  99.60 %         PA :  73.40 %               73.40 %         PCW :  97.90 %    VO2 Content       Phase:Baseline       VO2 :  249.25 ml/min    Resistance Results (Wood Units)       Phase:Baseline       PVR :  1.71 DYER        SVR :  9.51 DYER    Cardiac Output       Phase:Baseline       Samuel :  5.46 l/min        Thermo Dilution :  4 l/min        Samuel Cardiac Index:  2.69 L/min/m2        Thermo Dilution Cardiac Index:  2 L/min/m2      TTE 11/11/2020  CONCLUSIONS   # Severe left ventricular dilation is present. LVEDd 7.7 cm. Severe   diffuse hypokinesis is present. Left ventricular ejection fraction is   visually estimated at 10%.   # Increased trabeculation is seen in the apex, but no clear thrombus   on the contrast enhanced pictures.   # Right ventricular dilation is present. Basal diameter is 5.1 cm.   Global right ventricular systolic function is moderately reduced.   # Bi atrial enlargement.   # Mild to moderate mitral regurgitation.   # Pulmonary artery systolic pressure estimate is 26 mmHg above   RAP(Normal) .   # Mild dilation of the aorta is present involving the sinuses of   Valsalva. (Maximal dimension 4.2 cm).   # Dilation of the inferior vena  cava (> 2.1 cm) with abnormal   respiratory variation in diameter. Estimated right atrial pressure is   15 mmHg .   # There is no pericardial effusion.   No previous study available for comparison.   Given the above abnormalities, the patient is scheduled for urgent  cardiology consult later today.      Lexiscan 11/11/20  Summary     1. Myocardial perfusion imaging is abnormal.     2. A regadenoson infusion pharmacologic stress test was performed.     3. The patient experienced no symptoms during the stress test.     4. Nondiagnostic stress ECG due to left bundle branch block.     5. Severely enlarged left ventricular size.     6. There was a large, medium, predominantly fixed myocardial perfusion   defect of the inferior septum. There is also a small defect in the apex with some degree of reversibility.     7. Abnormal left ventricular systolic function. Calculated LVEF 9 %.     8. Based on this study, the annual cardiovascular mortality rate is high (greater than 3%).     9. Findings suggest non ischemic cardiomyopathy as the degree of perfusion defect is out of proportion to the LV dysfunction.     TTE 8/2021   The visual ejection fraction is 20-25% with moderate dilation (LVEDd 5.9cm).  Left ventricular wall thickness is normal. There is global moderate hypokinesis with dyskinesis of the inferior wall  Right ventricular function, chamber size, wall motion, and thickness are normal.  Abnormal septal motion consistent with left bundle branch block is present.  No significant valvular abnormalities present.    TTE 2/2022 (done at [facility])  Left ventricular ejection fraction is visually estimated at 55%.   No significant valvular abnormalities were identified.   Compared with the previous study dated 4/12/21, the left ventricular   function has significantly improved.     TTE 2/2023 (done at [facility])  Left ventricular ejection fraction is visually estimated at 55%.   No significant valvular abnormalities were  identified.   Dilation of the aorta is present involving the sinuses of Valsalva.   (Maximal dimension 4.4 cm).   Compared with the previous study dated 2/22/22, there has been no   significant change.       Labs:  Reviewed, no significant changes today     Assessment and Plan:   57 year old M with NICM initially referred for evaluation for advanced therapies.  He has nonischemic cardiomyopathy, ACC/AHA Stage C and NYHA Functional Class II sxs. He is on GDMT and appears to be tolerating them well. QRS is 188 ms wide LBBB on EKG,  S/P CRT- D placement 11/2021. Repeat TTE done on 2/2022 showed recovered LVEF 55%. Currently appears euvolemic.       NICM with HFrEF LVEF 17% TTE 4/2021 ACC AHA Stage C NYHA Class II s/p CRT-D 11/2021, Recovered LVEF 55% (2/2022)     Colonoscopy 5/2021- tubular adenoma in cecum    Sleep apnea compliant with CPAP    History Alcohol use disorder    Former smoker    HFrEF GDMT & Regimen:   ACEi/ARB: entrestro   Role for Entresto: full dose entresto  97-103mg BID  BB: Toprol XL 50mg BID   Aldosterone antagonist: aldactone 25mg daily   SCD prophylaxis: CRT-D 11/2021    NSAID use: None, counseled/reminded  Smoking/Etoh/Drugs Counseling: done  Diet/Fluid Restriction Counseling: Done  Cardiac Rehab: completed  Need for advanced therapies/workup: Discussed continue to monitor with yearly follow-up    Plan:  -continue current medications, recommend maintain curreng GDMT (per TRED-HF trial) because it has been effective  -continue routine follow-up with  and  at [facility]   -continue [facility] HF clinic follow-up in 1 year     The patient was seen and discussed with attending physician, Dr. Jj Juarez MD.     Serg Fairbanks MD, PhD  Cardiology Fellow  Click to text page [phone]    I have seen and evaluated the patient and agree with the assessment and plan as above. Patient with recovered LV function, LVEDD amazingly decreased from 8cm to just above 4 cm. He did have 2  episodes of SVT, last lasting for 10 minutes, rate in the 180s. May need to adjust metoprolol if recurrence,      Please do not hesitate to contact me if you have any questions/concerns.     Sincerely,     Jj Juarez MD

## 2023-06-01 ENCOUNTER — HEALTH MAINTENANCE LETTER (OUTPATIENT)
Age: 58
End: 2023-06-01

## 2024-01-30 ENCOUNTER — TELEPHONE (OUTPATIENT)
Dept: CARDIOLOGY | Facility: CLINIC | Age: 59
End: 2024-01-30
Payer: COMMERCIAL

## 2024-01-30 NOTE — TELEPHONE ENCOUNTER
"Left Voicemail (1st Attempt) and Sent Mychart (1st Attempt) for the patient to call back and schedule the following:    Appointment type: Enrollment CORE  Provider: ANY CORE LORE \"Tell pt that LORE is Dr. Juarez's LORE\"- per Yeison DIANA   Return date: 1 year follow-up (2/28/2024)  Specialty phone number: [phone] option 1  Additional appointment(s) needed: lab prior  Additonal Notes: 1/30 LVM and MYC for pt to schedule Enrollment CORE w/ \"Dr. Juarez's LORE\" w/ lab prior. MJ    "

## 2024-01-30 NOTE — TELEPHONE ENCOUNTER
[facility] Call Center    Phone Message    May a detailed message be left on voicemail: yes     Reason for Call: Other: please clarify who you are wanting pt. To see for this enrollment core appointment as there is no LORE listed on scheduling protocols for [facility] for heart failure/core. Call pt back to schedule      Action Taken: Other: cardiology     Travel Screening: Not Applicable                                                   Thank you!  Specialty Access Center

## 2024-01-30 NOTE — TELEPHONE ENCOUNTER
1/30 Pt was called back at 12:55PM and scheduled for Enrollment CORE by Geetha Hoyt. Checked with Kelly Sahu to see if we should call pt back still and she said no and to document that Geetha scheduled him at 12:55PM. ERNA

## 2024-03-01 ENCOUNTER — PATIENT OUTREACH (OUTPATIENT)
Dept: CARDIOLOGY | Facility: CLINIC | Age: 59
End: 2024-03-01
Payer: COMMERCIAL

## 2024-03-01 NOTE — PROGRESS NOTES
Called patient to review CORE Enrollment patient. Chart reviewed and noted he has been seeing Dr. Juarez yearly for monitoring for advanced therapies. EF has improved from 10% to 55%. In between follows with [facility]. Was scheduled for CORE appt in place of Dr. Juarez as he has no availability until later in summer.     Reviewed with manager. Called to discuss with patient as he was recently seen by his [facility] team.   No answer and was a generic voicemail. Did not leave message. Will follow up next week.

## 2024-03-05 ENCOUNTER — TELEPHONE (OUTPATIENT)
Dept: CARDIOLOGY | Facility: CLINIC | Age: 59
End: 2024-03-05
Payer: COMMERCIAL

## 2024-03-05 NOTE — TELEPHONE ENCOUNTER
Spoke with Joel, he just had a cardiology appointment at .  He would like to push his appointment out so that he can see Dr Juarez in the summer.      Will change appts.

## 2024-03-05 NOTE — TELEPHONE ENCOUNTER
[facility] Call Center    Phone Message    May a detailed message be left on voicemail: yes     Reason for Call: Other: Joel called back returning Corinne Lim's call.  He states he will wait for a call back, but may also be in a meeting.     Action Taken: Other: Cardiology    Travel Screening: Not Applicable    Thank you!  Specialty Access Center

## 2024-06-03 ENCOUNTER — TELEPHONE (OUTPATIENT)
Dept: CARDIOLOGY | Facility: CLINIC | Age: 59
End: 2024-06-03
Payer: COMMERCIAL

## 2024-06-03 NOTE — TELEPHONE ENCOUNTER
6/3 Left Voicemail (1st Attempt) and MyChart (1st Attempt)  for the patient to call back and schedule the following:    Appointment type: Return Heart Failure  Provider: Larry  Return date: 8/6  (held slot)  Specialty phone number: [phone] opt 1  Additional appointment(s) needed: labs prior   Additonal Notes: n/a

## 2024-06-05 ENCOUNTER — TELEPHONE (OUTPATIENT)
Dept: CARDIOLOGY | Facility: CLINIC | Age: 59
End: 2024-06-05
Payer: COMMERCIAL

## 2024-06-05 NOTE — TELEPHONE ENCOUNTER
6/5 Patient confirmed scheduled appointment:  Date: 8/6/2024  Time: 11:30 am  Visit type: Return Heart Failure  Provider: Larry  [place] [facility] [place]/imaging: labs prior   Additional notes: n/a

## 2024-07-31 ENCOUNTER — CARE COORDINATION (OUTPATIENT)
Dept: CARDIOLOGY | Facility: CLINIC | Age: 59
End: 2024-07-31
Payer: COMMERCIAL

## 2024-07-31 DIAGNOSIS — I50.20 HEART FAILURE WITH REDUCED EJECTION FRACTION, NYHA CLASS III (H): Primary | ICD-10-CM

## 2024-08-05 NOTE — PROGRESS NOTES
[facility]   8/6/2024      HPI: Joel Resendiz is a 57 year old male seen in the [facility]. He has the following significant PMHx:   NICM with HF recovered EF LVEF 55% TTE 2/2022 ACC AHA Stage C NYHA Class II  Sleep apnea compliant with CPAP  History Alcohol use disorder  Former smoker    Cardiac Hx:   NICM initially dx Nov 2020- he had SOB x2-3 months couldn't walk dog <0.5 mile up a small hill next to his house, orthopnea- COVID neg.  TTE with LVEF 10% LVID 7.7cm.  LHC 11/23/20 mild to mod nonobstructive CAD with RHC mPAP 29, WP 19, CO 4.1.  cMRI 12/7/20 showed LVEF 13%, LVIDD 8.1cm, RV EF 27%, no LGE, 2+MR. EKG with wide LBBB QRS 192ms.  He was started on GDMT on Toprol 100mg bid, full dose Entresto, Aldactone 25mg daily. Other cardiac meds include lipitor 40 and baby aspirin.  TTE April 2021 showed LVEF 17% LVIDD 6.7cm. TTE in August 2021 showed LVEF 20-25%, LVIDD 5.9cm. He underwent CRT-D implantation 11/2021.  He has been following with Dr. Barney at [facility] who referred him to us.     He was last seen in clinic February 2023.  No medication adjustments were made at that time.  Currently, his GDMT regimen remains high-dose Entresto, metoprolol succinate 100 mg daily, spironolactone 50 mg daily.  He is on aspirin and statin for CAD.    Since his last visit, he is overall doing well.  Last seen 18 months ago.  No significant changes in his health in the interim.  No ED visits or heart failure hospitalizations.  He recently got .  He has improved his diet and lost weight with intentional dietary changes.  He lives a sedentary lifestyle but is motivated to improve this.  He has not noticed any symptoms in his day-to-day activities, but he does not exercise on a regular basis.  No chest pain or shortness of breath.  No lightheadedness or dizziness.  No syncope.  He has a CRT-D in place that is managed by  [facility].  No ICD shocks.    By way of review: His GDMT regimen is metoprolol succinate 50 mg twice daily, high-dose Entresto twice daily, spironolactone 50 mg daily.  He does not require loop diuretic.    CURRENT MEDICATIONS:    Current Outpatient Medications:     Ascorbic Acid (VITAMIN C) POWD, , Disp: , Rfl:     aspirin (ASA) 81 MG chewable tablet, Take 81 mg by mouth daily, Disp: , Rfl:     atorvastatin (LIPITOR) 40 MG tablet, Take 40 mg by mouth daily, Disp: , Rfl:     metoprolol succinate ER (TOPROL-XL) 100 MG 24 hr tablet, Take 100 mg by mouth daily , Disp: , Rfl:     sacubitril-valsartan (ENTRESTO)  MG per tablet, Take 1 tablet by mouth 2 times daily, Disp: , Rfl:     spironolactone (ALDACTONE) 25 MG tablet, Take 50 mg by mouth daily, Disp: , Rfl:     ROS:   Constitutional: No fever, chills, or sweats. Weight . + fatigue   ENT: No visual disturbance, ear ache, epistaxis, sore throat.   Allergies/Immunologic: Negative.   Respiratory: No cough, hemoptysis.   Cardiovascular: As per HPI.   GI: No nausea, vomiting, hematemesis, melena, or hematochezia.   : No urinary frequency, dysuria, or hematuria.   Integument: Negative.   Psychiatric: Negative.   Neuro: Negative.   Endocrinology: Negative.   Musculoskeletal: Negative.    EXAM:  /81 (BP Location: Right arm, Patient Position: Chair, Cuff Size: Adult Large)   Pulse 64   Wt 87.2 kg (192 lb 3.2 oz)   SpO2 97%   BMI 28.80 kg/m      General: appears comfortable, alert and articulate  Head: normocephalic, atraumatic  Eyes: anicteric sclera, EOMI  Neck: Supple, full range of motion.  Orophyarynx: moist mucosa, no lesions, dentition intact  Heart: regular rate and rhythm, S1/S2 normal, no rub, estimated JVP 6 cm   Lungs: clear, no rales or wheezing  Abdomen: soft, non-tender, bowel sounds present, no hepatosplenomegaly  Extremities: no clubbing, cyanosis or edema  Neurological: normal speech and affect, no gross motor deficit    TTE  4/12/21  1. Moderate left ventricular dilation is present. LVEDd 6.7 cm. Normal left ventricular wall thickness. Abnormal septal motion consistent with left bundle branch block. Severe diffuse hypokinesis is present.   Left ventricular ejection fraction is visually estimated at 17%. Left   ventricular Doppler filling pattern consistent with Grade I (mild)   diastolic dysfunction.   2. Normal right ventricle size and normal global function.   3. No significant valve pathology was seen.   4. Moderate dilation of the aorta is present involving the sinuses of   Valsalva. (Maximal dimension 4.7 cm).   5. The inferior vena cava is small suggesting volume depletion.   6. There is no pericardial effusion.   7. Compared to the previous study done on 11/11/20, the left ventricle   has decreased in size but the left ventricular systolic function   remains severely depressed. The right ventricle has normalized in size   and function. There is less mitral and tricuspid regurgitation. The   central venous pressure has normalized. However, the sinus of Valsalva   of the aorta measures larger (4.2 cm --> 4.7 cm).     [facility]  Findings   Patient had a min HR of 41 bpm, max sinus HR of 178 bpm, and avg HR of 74 bpm.  Predominant underlying rhythm was Sinus Rhythm. First Degree AV Block was present. Bundle Branch Block/IVCD was present. QRS morphology changes were present throughout recording.   5 Ventricular Tachycardia runs occurred, the run with the fastest interval lasting 5 beats with a max rate of 179 bpm, the longest lasting 4 beats with an avg rate of 101 bpm.   Isolated SVEs were rare (<1.0%), SVE Couplets were rare (<1.0%), and SVE Triplets were rare (<1.0%).   Isolated VEs were rare (<1.0%, 231), VE Couplets were rare (<1.0%, 3), and VE Triplets were rare (<1.0%, 4). Ventricular Bigeminy was present.   Comment: Symptoms occurred in association with PVCs or ventricular ectopy.     Cardiac MRI  12/7/20  IMPRESSION:   1. The left ventricular systolic function was abnormal; calculated ejection fraction 13%.   Severe diffuse hypokinesis with abnormal septal motion consistent with LBBB.   2. Severe LV dilatation (LVIDD 81mm) with increased LV volumes.   3. Reduced RV systolic function. EF is calculated at 27%.   4. Delayed enhancement was not seen.  5. At least 2+ mitral regurgitation is seen.   6. Mild dilatation of Sinuses of Valsalva (42mm)   7. No LV clot seen.   Findings are consistent with non ischemic cardiomyopathy.     L+R Cath 11/23/20    1. Mild-to-moderate coronary artery atherosclerosis.     2. Mild pulmonary artery hypertension (mean PA = 29 mmHg), related to elevated left atrial pressure.     3. Mild pulmonary capillary wedge hypertension (mean PCWP = 19 mmHg).     4. Mildly reduced cardiac output (thermodilution) of 4.1 L/min (CI = 2.0 L/min/m2).     5. Nonischemic cardiomyopathy.   Diagnostic Findings     * Coronary angiography shows right dominance.     * Left Main has no disease.     * Proximal Left Anterior Descending: minimal  30% stenosis, NAIF: 3 flow.     * Mid Left Anterior Descending: minimal  30% stenosis, NAIF: 3 flow.     * Proximal Circumflex: luminal irregularities, NAIF: 3 flow.     * Mid Circumflex: mild  40% stenosis, NAIF: 3 flow.     * Proximal Right Coronary Artery: minimal  30% stenosis, NAIF: 3 flow.     * Mid Right Coronary Artery: mild  40% stenosis, NAIF: 3 flow.     Cath Hemodynamic Data       Phase:Baseline       AO :  87 mmHg / 67 mmHg ( 59 mmHg )        RV :  39 mmHg / 3 mmHg / 11 mmHg )      PA :  42 mmHg / 21 mmHg ( 29 mmHg )       RA :  a wave = 32,767 mmHg v wave = 9 mmHg mean = 7 mmHg        PCW :  a wave = 32,767 mmHg v wave = 26 mmHg mean = 19 mmHg              a wave = 32,767 mmHg v wave = 23 mmHg mean = 19 mmHg        AO HR:  77 bpm    Saturations       Phase:Baseline       AO :  99.60 %         PA :  73.40 %               73.40 %         PCW :  97.90 %     VO2 Content       Phase:Baseline       VO2 :  249.25 ml/min    Resistance Results (Wood Units)       Phase:Baseline       PVR :  1.71 DYER        SVR :  9.51 DYER    Cardiac Output       Phase:Baseline       Samuel :  5.46 l/min        Thermo Dilution :  4 l/min        Samuel Cardiac Index:  2.69 L/min/m2        Thermo Dilution Cardiac Index:  2 L/min/m2      TTE 11/11/2020  # Severe left ventricular dilation is present. LVEDd 7.7 cm. Severe diffuse hypokinesis is present. Left ventricular ejection fraction is visually estimated at 10%.   # Increased trabeculation is seen in the apex, but no clear thrombus on the contrast enhanced pictures.   # Right ventricular dilation is present. Basal diameter is 5.1 cm. Global right ventricular systolic function is moderately reduced.   # Bi atrial enlargement.   # Mild to moderate mitral regurgitation.   # Pulmonary artery systolic pressure estimate is 26 mmHg above RAP(Normal) .   # Mild dilation of the aorta is present involving the sinuses of Valsalva. (Maximal dimension 4.2 cm).   # Dilation of the inferior vena cava (> 2.1 cm) with abnormal respiratory variation in diameter. Estimated right atrial pressure is 15 mmHg .   # There is no pericardial effusion. No previous study available for comparison.   Given the above abnormalities, the patient is scheduled for urgent  cardiology consult later today.        Lexiscan 11/11/20    1. Myocardial perfusion imaging is abnormal.     2. A regadenoson infusion pharmacologic stress test was performed.     3. The patient experienced no symptoms during the stress test.     4. Nondiagnostic stress ECG due to left bundle branch block.     5. Severely enlarged left ventricular size.     6. There was a large, medium, predominantly fixed myocardial perfusion   defect of the inferior septum. There is also a small defect in the apex with some degree of reversibility.     7. Abnormal left ventricular systolic function. Calculated LVEF 9 %.     8.  Based on this study, the annual cardiovascular mortality rate is high (greater than 3%).     9. Findings suggest non ischemic cardiomyopathy as the degree of perfusion defect is out of proportion to the LV dysfunction.     TTE 8/2021   The visual ejection fraction is 20-25% with moderate dilation (LVEDd 5.9cm).  Left ventricular wall thickness is normal. There is global moderate hypokinesis with dyskinesis of the inferior wall  Right ventricular function, chamber size, wall motion, and thickness are normal.  Abnormal septal motion consistent with left bundle branch block is present.  No significant valvular abnormalities present.    TTE 2/2022 (done at [facility])  Left ventricular ejection fraction is visually estimated at 55%.   No significant valvular abnormalities were identified.   Compared with the previous study dated 4/12/21, the left ventricular   function has significantly improved.     TTE 2/2023 (done at [facility])  Left ventricular ejection fraction is visually estimated at 55%.   No significant valvular abnormalities were identified.   Dilation of the aorta is present involving the sinuses of Valsalva. (Maximal dimension 4.4 cm).   Compared with the previous study dated 2/22/22, there has been no significant change.     Labs:  Reviewed, no significant changes today     Assessment and Plan:   59 year old M with NICM initially referred for evaluation for advanced therapies.  He has nonischemic cardiomyopathy, ACC/AHA Stage C and NYHA Functional Class II sxs. He is on GDMT and appears to be tolerating them well. QRS is 188 ms wide LBBB on EKG,  S/P CRT- D placement 11/2021. Repeat TTE done on 2/2022 showed recovered LVEF 55%.     He is euvolemic and well compensated.  NYHA II.  No acute concerns today.  No recent heart failure hospitalizations.    Cardiovascular Problem List  NICM with HFrecEF, LVEF 17% TTE 4/2021 ACC AHA Stage C NYHA Class II s/p CRT-D 11/2021, Recovered LVEF 55% (2/2022)   Sleep apnea  compliant with CPAP  History Alcohol use disorder  Former smoker    HFrEF GDMT & Regimen:   ACE/ARB/ARNI: Entresto  twice daily  BB: Metoprolol succinate 50 mg twice daily  Aldosterone antagonist: aldactone 50mg daily   SCD prophylaxis: CRT-D 11/2021 -recent device interrogation demonstrated BiV pacing 98% of the time.  Diuretic: None.  Euvolemic on exam today.    Plan:  -continue current GDMT regimen as he is tolerating this well without any side effects.  -Patient is due for TTE, which we will order  - clinic follow-up in 1 year     The patient was seen and discussed with attending physician, Dr. Jj Juarez MD.     Loyd Cai MD  Cardiology Fellow     I have seen and evaluated the patient and agree with the assessment and plan as above  I appreciate the opportunity to participate in the care of Albino CLAY Saulo Danielterence . Please do not hesitate to contact me with any further questions.  I have spent a total of 40 minutes today reviewing labs, imaging studies, discussing with colleagues, face-to-face time with patient and documentation in the medical record.  The longitudinal plan of care for the diagnosis(es)/condition(s) as documented were addressed during this visit. Due to the added complexity in care, I will continue to support Albino in the subsequent management and with ongoing continuity of care.    Sincerely,   Jj Juarez MD  [facility] Division of Cardiology

## 2024-08-06 ENCOUNTER — LAB (OUTPATIENT)
Dept: LAB | Facility: CLINIC | Age: 59
End: 2024-08-06
Attending: INTERNAL MEDICINE
Payer: COMMERCIAL

## 2024-08-06 ENCOUNTER — OFFICE VISIT (OUTPATIENT)
Dept: CARDIOLOGY | Facility: CLINIC | Age: 59
End: 2024-08-06
Attending: INTERNAL MEDICINE
Payer: COMMERCIAL

## 2024-08-06 VITALS
WEIGHT: 192.2 LBS | OXYGEN SATURATION: 97 % | BODY MASS INDEX: 28.8 KG/M2 | HEART RATE: 64 BPM | SYSTOLIC BLOOD PRESSURE: 114 MMHG | DIASTOLIC BLOOD PRESSURE: 81 MMHG

## 2024-08-06 DIAGNOSIS — I50.20 HEART FAILURE WITH REDUCED EJECTION FRACTION, NYHA CLASS III (H): Primary | ICD-10-CM

## 2024-08-06 DIAGNOSIS — I42.9 CARDIOMYOPATHY, UNSPECIFIED TYPE (H): ICD-10-CM

## 2024-08-06 DIAGNOSIS — E78.2 MIXED HYPERLIPIDEMIA: ICD-10-CM

## 2024-08-06 DIAGNOSIS — I50.20 HEART FAILURE WITH REDUCED EJECTION FRACTION, NYHA CLASS III (H): ICD-10-CM

## 2024-08-06 DIAGNOSIS — I47.10 SVT (SUPRAVENTRICULAR TACHYCARDIA) (H): ICD-10-CM

## 2024-08-06 DIAGNOSIS — I10 BENIGN ESSENTIAL HYPERTENSION: ICD-10-CM

## 2024-08-06 DIAGNOSIS — I42.8 NONISCHEMIC CARDIOMYOPATHY (H): ICD-10-CM

## 2024-08-06 LAB
ALBUMIN SERPL BCG-MCNC: 4.4 G/DL (ref 3.5–5.2)
ALP SERPL-CCNC: 57 U/L (ref 40–150)
ALT SERPL W P-5'-P-CCNC: 15 U/L (ref 0–70)
ANION GAP SERPL CALCULATED.3IONS-SCNC: 10 MMOL/L (ref 7–15)
AST SERPL W P-5'-P-CCNC: 20 U/L (ref 0–45)
BILIRUB SERPL-MCNC: 0.3 MG/DL
BUN SERPL-MCNC: 12.6 MG/DL (ref 8–23)
CALCIUM SERPL-MCNC: 9.2 MG/DL (ref 8.8–10.4)
CHLORIDE SERPL-SCNC: 104 MMOL/L (ref 98–107)
CREAT SERPL-MCNC: 1.09 MG/DL (ref 0.67–1.17)
EGFRCR SERPLBLD CKD-EPI 2021: 78 ML/MIN/1.73M2
ERYTHROCYTE [DISTWIDTH] IN BLOOD BY AUTOMATED COUNT: 12.5 % (ref 10–15)
GLUCOSE SERPL-MCNC: 103 MG/DL (ref 70–99)
HCO3 SERPL-SCNC: 25 MMOL/L (ref 22–29)
HCT VFR BLD AUTO: 36.7 % (ref 40–53)
HGB BLD-MCNC: 12.4 G/DL (ref 13.3–17.7)
MCH RBC QN AUTO: 31.3 PG (ref 26.5–33)
MCHC RBC AUTO-ENTMCNC: 33.8 G/DL (ref 31.5–36.5)
MCV RBC AUTO: 93 FL (ref 78–100)
NT-PROBNP SERPL-MCNC: 66 PG/ML (ref 0–900)
PLATELET # BLD AUTO: 244 10E3/UL (ref 150–450)
POTASSIUM SERPL-SCNC: 4.3 MMOL/L (ref 3.4–5.3)
PROT SERPL-MCNC: 7.3 G/DL (ref 6.4–8.3)
RBC # BLD AUTO: 3.96 10E6/UL (ref 4.4–5.9)
SODIUM SERPL-SCNC: 139 MMOL/L (ref 135–145)
WBC # BLD AUTO: 7.3 10E3/UL (ref 4–11)

## 2024-08-06 PROCEDURE — 99215 OFFICE O/P EST HI 40 MIN: CPT | Performed by: INTERNAL MEDICINE

## 2024-08-06 PROCEDURE — 80053 COMPREHEN METABOLIC PANEL: CPT | Performed by: PATHOLOGY

## 2024-08-06 PROCEDURE — 83880 ASSAY OF NATRIURETIC PEPTIDE: CPT | Performed by: PATHOLOGY

## 2024-08-06 PROCEDURE — 36415 COLL VENOUS BLD VENIPUNCTURE: CPT | Performed by: PATHOLOGY

## 2024-08-06 PROCEDURE — 99213 OFFICE O/P EST LOW 20 MIN: CPT | Performed by: INTERNAL MEDICINE

## 2024-08-06 PROCEDURE — G2211 COMPLEX E/M VISIT ADD ON: HCPCS | Performed by: INTERNAL MEDICINE

## 2024-08-06 PROCEDURE — 85027 COMPLETE CBC AUTOMATED: CPT | Performed by: PATHOLOGY

## 2024-08-06 ASSESSMENT — PAIN SCALES - GENERAL: PAINLEVEL: NO PAIN (0)

## 2024-08-06 NOTE — PATIENT INSTRUCTIONS
Dr. Juarez recommends:    Echocardiogram in the near future.    Follow up clinic visit with Dr. Juarez in one year with labs the same day.    Thank you for your visit today.  Please call me with any questions or concerns.   Padilla Knight RN  Cardiology Care Coordinator  [phone]

## 2024-08-06 NOTE — LETTER
8/6/2024      RE: Joel Resendiz  [address]  [facility] [address]       Dear Colleague,    Thank you for the opportunity to participate in the care of your patient, Joel Resendiz, at the [facility] [place] [facility]. Please see a copy of my visit note below.    [facility]   8/6/2024      HPI: Joel Resendiz is a 57 year old male seen in the [facility]. He has the following significant PMHx:   NICM with HF recovered EF LVEF 55% TTE 2/2022 ACC AHA Stage C NYHA Class II  Sleep apnea compliant with CPAP  History Alcohol use disorder  Former smoker    Cardiac Hx:   NICM initially dx Nov 2020- he had SOB x2-3 months couldn't walk dog <0.5 mile up a small hill next to his house, orthopnea- COVID neg.  TTE with LVEF 10% LVID 7.7cm.  LHC 11/23/20 mild to mod nonobstructive CAD with RHC mPAP 29, WP 19, CO 4.1.  cMRI 12/7/20 showed LVEF 13%, LVIDD 8.1cm, RV EF 27%, no LGE, 2+MR. EKG with wide LBBB QRS 192ms.  He was started on GDMT on Toprol 100mg bid, full dose Entresto, Aldactone 25mg daily. Other cardiac meds include lipitor 40 and baby aspirin.  TTE April 2021 showed LVEF 17% LVIDD 6.7cm. TTE in August 2021 showed LVEF 20-25%, LVIDD 5.9cm. He underwent CRT-D implantation 11/2021.  He has been following with Dr. Barney at [facility] who referred him to us.     He was last seen in clinic February 2023.  No medication adjustments were made at that time.  Currently, his GDMT regimen remains high-dose Entresto, metoprolol succinate 100 mg daily, spironolactone 50 mg daily.  He is on aspirin and statin for CAD.    Since his last visit, he is overall doing well.  Last seen 18 months ago.  No significant changes in his health in the interim.  No ED visits or heart failure hospitalizations.  He recently got .  He has improved his diet and lost  weight with intentional dietary changes.  He lives a sedentary lifestyle but is motivated to improve this.  He has not noticed any symptoms in his day-to-day activities, but he does not exercise on a regular basis.  No chest pain or shortness of breath.  No lightheadedness or dizziness.  No syncope.  He has a CRT-D in place that is managed by [facility].  No ICD shocks.    By way of review: His GDMT regimen is metoprolol succinate 50 mg twice daily, high-dose Entresto twice daily, spironolactone 50 mg daily.  He does not require loop diuretic.    CURRENT MEDICATIONS:    Current Outpatient Medications:      Ascorbic Acid (VITAMIN C) POWD, , Disp: , Rfl:      aspirin (ASA) 81 MG chewable tablet, Take 81 mg by mouth daily, Disp: , Rfl:      atorvastatin (LIPITOR) 40 MG tablet, Take 40 mg by mouth daily, Disp: , Rfl:      metoprolol succinate ER (TOPROL-XL) 100 MG 24 hr tablet, Take 100 mg by mouth daily , Disp: , Rfl:      sacubitril-valsartan (ENTRESTO)  MG per tablet, Take 1 tablet by mouth 2 times daily, Disp: , Rfl:      spironolactone (ALDACTONE) 25 MG tablet, Take 50 mg by mouth daily, Disp: , Rfl:     ROS:   Constitutional: No fever, chills, or sweats. Weight . + fatigue   ENT: No visual disturbance, ear ache, epistaxis, sore throat.   Allergies/Immunologic: Negative.   Respiratory: No cough, hemoptysis.   Cardiovascular: As per HPI.   GI: No nausea, vomiting, hematemesis, melena, or hematochezia.   : No urinary frequency, dysuria, or hematuria.   Integument: Negative.   Psychiatric: Negative.   Neuro: Negative.   Endocrinology: Negative.   Musculoskeletal: Negative.    EXAM:  /81 (BP Location: Right arm, Patient Position: Chair, Cuff Size: Adult Large)   Pulse 64   Wt 87.2 kg (192 lb 3.2 oz)   SpO2 97%   BMI 28.80 kg/m      General: appears comfortable, alert and articulate  Head: normocephalic, atraumatic  Eyes: anicteric sclera, EOMI  Neck: Supple, full range of motion.  Orophyarynx:  moist mucosa, no lesions, dentition intact  Heart: regular rate and rhythm, S1/S2 normal, no rub, estimated JVP 6 cm   Lungs: clear, no rales or wheezing  Abdomen: soft, non-tender, bowel sounds present, no hepatosplenomegaly  Extremities: no clubbing, cyanosis or edema  Neurological: normal speech and affect, no gross motor deficit    TTE 4/12/21  1. Moderate left ventricular dilation is present. LVEDd 6.7 cm. Normal left ventricular wall thickness. Abnormal septal motion consistent with left bundle branch block. Severe diffuse hypokinesis is present.   Left ventricular ejection fraction is visually estimated at 17%. Left   ventricular Doppler filling pattern consistent with Grade I (mild)   diastolic dysfunction.   2. Normal right ventricle size and normal global function.   3. No significant valve pathology was seen.   4. Moderate dilation of the aorta is present involving the sinuses of   Valsalva. (Maximal dimension 4.7 cm).   5. The inferior vena cava is small suggesting volume depletion.   6. There is no pericardial effusion.   7. Compared to the previous study done on 11/11/20, the left ventricle   has decreased in size but the left ventricular systolic function   remains severely depressed. The right ventricle has normalized in size   and function. There is less mitral and tricuspid regurgitation. The   central venous pressure has normalized. However, the sinus of Valsalva   of the aorta measures larger (4.2 cm --> 4.7 cm).     [facility]  [facility]   Patient had a min HR of 41 bpm, max sinus HR of 178 bpm, and avg HR of 74 bpm.  Predominant underlying rhythm was Sinus Rhythm. First Degree AV Block was present. Bundle Branch Block/IVCD was present. QRS morphology changes were present throughout recording.   5 Ventricular Tachycardia runs occurred, the run with the fastest interval lasting 5 beats with a max rate of 179 bpm, the longest lasting 4 beats with an avg rate of 101 bpm.   Isolated SVEs  were rare (<1.0%), SVE Couplets were rare (<1.0%), and SVE Triplets were rare (<1.0%).   Isolated VEs were rare (<1.0%, 231), VE Couplets were rare (<1.0%, 3), and VE Triplets were rare (<1.0%, 4). Ventricular Bigeminy was present.   Comment: Symptoms occurred in association with PVCs or ventricular ectopy.     Cardiac MRI 12/7/20  IMPRESSION:   1. The left ventricular systolic function was abnormal; calculated ejection fraction 13%.   Severe diffuse hypokinesis with abnormal septal motion consistent with LBBB.   2. Severe LV dilatation (LVIDD 81mm) with increased LV volumes.   3. Reduced RV systolic function. EF is calculated at 27%.   4. Delayed enhancement was not seen.  5. At least 2+ mitral regurgitation is seen.   6. Mild dilatation of Sinuses of Valsalva (42mm)   7. No LV clot seen.   Findings are consistent with non ischemic cardiomyopathy.     L+R Cath 11/23/20    1. Mild-to-moderate coronary artery atherosclerosis.     2. Mild pulmonary artery hypertension (mean PA = 29 mmHg), related to elevated left atrial pressure.     3. Mild pulmonary capillary wedge hypertension (mean PCWP = 19 mmHg).     4. Mildly reduced cardiac output (thermodilution) of 4.1 L/min (CI = 2.0 L/min/m2).     5. Nonischemic cardiomyopathy.   Diagnostic Findings     * Coronary angiography shows right dominance.     * Left Main has no disease.     * Proximal Left Anterior Descending: minimal  30% stenosis, NAIF: 3 flow.     * Mid Left Anterior Descending: minimal  30% stenosis, NAIF: 3 flow.     * Proximal Circumflex: luminal irregularities, NAIF: 3 flow.     * Mid Circumflex: mild  40% stenosis, NAIF: 3 flow.     * Proximal Right Coronary Artery: minimal  30% stenosis, NAIF: 3 flow.     * Mid Right Coronary Artery: mild  40% stenosis, NAIF: 3 flow.     Cath Hemodynamic Data       Phase:Baseline       AO :  87 mmHg / 67 mmHg ( 59 mmHg )        RV :  39 mmHg / 3 mmHg / 11 mmHg )      PA :  42 mmHg / 21 mmHg ( 29 mmHg )       RA :  a wave  = 32,767 mmHg v wave = 9 mmHg mean = 7 mmHg        PCW :  a wave = 32,767 mmHg v wave = 26 mmHg mean = 19 mmHg              a wave = 32,767 mmHg v wave = 23 mmHg mean = 19 mmHg        AO HR:  77 bpm    Saturations       Phase:Baseline       AO :  99.60 %         PA :  73.40 %               73.40 %         PCW :  97.90 %    VO2 Content       Phase:Baseline       VO2 :  249.25 ml/min    Resistance Results (Wood Units)       Phase:Baseline       PVR :  1.71 DYER        SVR :  9.51 DYER    Cardiac Output       Phase:Baseline       Samuel :  5.46 l/min        Thermo Dilution :  4 l/min        Samuel Cardiac Index:  2.69 L/min/m2        Thermo Dilution Cardiac Index:  2 L/min/m2      TTE 11/11/2020  # Severe left ventricular dilation is present. LVEDd 7.7 cm. Severe diffuse hypokinesis is present. Left ventricular ejection fraction is visually estimated at 10%.   # Increased trabeculation is seen in the apex, but no clear thrombus on the contrast enhanced pictures.   # Right ventricular dilation is present. Basal diameter is 5.1 cm. Global right ventricular systolic function is moderately reduced.   # Bi atrial enlargement.   # Mild to moderate mitral regurgitation.   # Pulmonary artery systolic pressure estimate is 26 mmHg above RAP(Normal) .   # Mild dilation of the aorta is present involving the sinuses of Valsalva. (Maximal dimension 4.2 cm).   # Dilation of the inferior vena cava (> 2.1 cm) with abnormal respiratory variation in diameter. Estimated right atrial pressure is 15 mmHg .   # There is no pericardial effusion. No previous study available for comparison.   Given the above abnormalities, the patient is scheduled for urgent  cardiology consult later today.        Lexiscan 11/11/20    1. Myocardial perfusion imaging is abnormal.     2. A regadenoson infusion pharmacologic stress test was performed.     3. The patient experienced no symptoms during the stress test.     4. Nondiagnostic stress ECG due to left bundle  branch block.     5. Severely enlarged left ventricular size.     6. There was a large, medium, predominantly fixed myocardial perfusion   defect of the inferior septum. There is also a small defect in the apex with some degree of reversibility.     7. Abnormal left ventricular systolic function. Calculated LVEF 9 %.     8. Based on this study, the annual cardiovascular mortality rate is high (greater than 3%).     9. Findings suggest non ischemic cardiomyopathy as the degree of perfusion defect is out of proportion to the LV dysfunction.     TTE 8/2021   The visual ejection fraction is 20-25% with moderate dilation (LVEDd 5.9cm).  Left ventricular wall thickness is normal. There is global moderate hypokinesis with dyskinesis of the inferior wall  Right ventricular function, chamber size, wall motion, and thickness are normal.  Abnormal septal motion consistent with left bundle branch block is present.  No significant valvular abnormalities present.    TTE 2/2022 (done at [facility])  Left ventricular ejection fraction is visually estimated at 55%.   No significant valvular abnormalities were identified.   Compared with the previous study dated 4/12/21, the left ventricular   function has significantly improved.     TTE 2/2023 (done at [facility])  Left ventricular ejection fraction is visually estimated at 55%.   No significant valvular abnormalities were identified.   Dilation of the aorta is present involving the sinuses of Valsalva. (Maximal dimension 4.4 cm).   Compared with the previous study dated 2/22/22, there has been no significant change.     Labs:  Reviewed, no significant changes today     Assessment and Plan:   59 year old M with NICM initially referred for evaluation for advanced therapies.  He has nonischemic cardiomyopathy, ACC/AHA Stage C and NYHA Functional Class II sxs. He is on GDMT and appears to be tolerating them well. QRS is 188 ms wide LBBB on EKG,  S/P CRT- D placement 11/2021. Repeat TTE  done on 2/2022 showed recovered LVEF 55%.     He is euvolemic and well compensated.  NYHA II.  No acute concerns today.  No recent heart failure hospitalizations.    Cardiovascular Problem List  NICM with HFrecEF, LVEF 17% TTE 4/2021 ACC AHA Stage C NYHA Class II s/p CRT-D 11/2021, Recovered LVEF 55% (2/2022)   Sleep apnea compliant with CPAP  History Alcohol use disorder  Former smoker    HFrEF GDMT & Regimen:   ACE/ARB/ARNI: Entresto  twice daily  BB: Metoprolol succinate 50 mg twice daily  Aldosterone antagonist: aldactone 50mg daily   SCD prophylaxis: CRT-D 11/2021 -recent device interrogation demonstrated BiV pacing 98% of the time.  Diuretic: None.  Euvolemic on exam today.    Plan:  -continue current GDMT regimen as he is tolerating this well without any side effects.  -Patient is due for TTE, which we will order  - clinic follow-up in 1 year     The patient was seen and discussed with attending physician, Dr. Jj Juarez MD.     Loyd Cai MD  Cardiology Fellow     I have seen and evaluated the patient and agree with the assessment and plan as above  I appreciate the opportunity to participate in the care of Albino Nolasco . Please do not hesitate to contact me with any further questions.  I have spent a total of 40 minutes today reviewing labs, imaging studies, discussing with colleagues, face-to-face time with patient and documentation in the medical record.  The longitudinal plan of care for the diagnosis(es)/condition(s) as documented were addressed during this visit. Due to the added complexity in care, I will continue to support Albino in the subsequent management and with ongoing continuity of care.    Sincerely,   Jj Juarez MD  [facility] Division of Cardiology       Please do not hesitate to contact me if you have any questions/concerns.     Sincerely,     Jj Juarez MD

## 2024-08-06 NOTE — NURSING NOTE
Chief Complaint   Patient presents with    Follow Up      nonischemic cardiomyopathy.       Vitals were taken, medications reconciled.    Paige Thurner, Facilitator   11:28 AM

## 2024-09-13 ENCOUNTER — ANCILLARY PROCEDURE (OUTPATIENT)
Dept: CARDIOLOGY | Facility: CLINIC | Age: 59
End: 2024-09-13
Attending: INTERNAL MEDICINE
Payer: COMMERCIAL

## 2024-09-13 DIAGNOSIS — I50.20 HEART FAILURE WITH REDUCED EJECTION FRACTION, NYHA CLASS III (H): ICD-10-CM

## 2024-09-13 LAB — LVEF ECHO: NORMAL

## 2024-09-13 PROCEDURE — 99207 PR STATISTIC IV PUSH SINGLE INITIAL SUBSTANCE: CPT | Performed by: INTERNAL MEDICINE

## 2024-09-13 PROCEDURE — 93306 TTE W/DOPPLER COMPLETE: CPT | Performed by: INTERNAL MEDICINE

## 2024-09-13 RX ADMIN — Medication 5 ML: at 15:40

## 2025-04-09 ENCOUNTER — TELEPHONE (OUTPATIENT)
Dept: CARDIOLOGY | Facility: CLINIC | Age: 60
End: 2025-04-09
Payer: COMMERCIAL

## 2025-04-09 NOTE — TELEPHONE ENCOUNTER
Patient Contacted for the patient to call back and schedule the following:    Appointment type: RTN HF  Provider: SARAH  Return date: 12/2/2025  Specialty phone number: [phone] OPT 1  Additional appointment(s) needed: LABS PRIOR  Additonal Notes: ANNUAL FOLLOW-UP WITH SARAH

## 2025-07-05 ENCOUNTER — HEALTH MAINTENANCE LETTER (OUTPATIENT)
Age: 60
End: 2025-07-05